# Patient Record
Sex: MALE | Race: WHITE | NOT HISPANIC OR LATINO | Employment: FULL TIME | URBAN - METROPOLITAN AREA
[De-identification: names, ages, dates, MRNs, and addresses within clinical notes are randomized per-mention and may not be internally consistent; named-entity substitution may affect disease eponyms.]

---

## 2017-01-04 DIAGNOSIS — K92.1 BLOOD IN STOOL: Primary | ICD-10-CM

## 2017-01-04 DIAGNOSIS — R19.5 OCCULT BLOOD POSITIVE STOOL: Primary | ICD-10-CM

## 2017-01-04 LAB
DEVELOPER EXPIRATION DATE: ABNORMAL
DEVELOPER LOT NUMBER: ABNORMAL
EXPIRATION DATE: ABNORMAL
FECAL OCCULT BLOOD SCREEN, POC: ABNORMAL
Lab: ABNORMAL
NEGATIVE CONTROL: NEGATIVE
POSITIVE CONTROL: POSITIVE

## 2017-01-04 PROCEDURE — 82274 ASSAY TEST FOR BLOOD FECAL: CPT | Performed by: NURSE PRACTITIONER

## 2017-01-04 NOTE — PROGRESS NOTES
Case discussed with nurse practitioner.  Agree with referral to gastroenterology for colonoscopy.  Dr. Wilks

## 2017-01-25 ENCOUNTER — TELEPHONE (OUTPATIENT)
Dept: GASTROENTEROLOGY | Facility: CLINIC | Age: 37
End: 2017-01-25

## 2017-01-26 ENCOUNTER — OFFICE VISIT (OUTPATIENT)
Dept: GASTROENTEROLOGY | Facility: CLINIC | Age: 37
End: 2017-01-26

## 2017-01-26 VITALS
BODY MASS INDEX: 27.23 KG/M2 | HEART RATE: 70 BPM | HEIGHT: 75 IN | TEMPERATURE: 98 F | DIASTOLIC BLOOD PRESSURE: 68 MMHG | SYSTOLIC BLOOD PRESSURE: 110 MMHG | WEIGHT: 219 LBS

## 2017-01-26 DIAGNOSIS — R19.7 DIARRHEA, UNSPECIFIED TYPE: Primary | ICD-10-CM

## 2017-01-26 DIAGNOSIS — Z80.0 FAMILY HISTORY OF GI MALIGNANCY: ICD-10-CM

## 2017-01-26 DIAGNOSIS — K62.5 RECTAL/ANAL HEMORRHAGE: ICD-10-CM

## 2017-01-26 PROCEDURE — 99204 OFFICE O/P NEW MOD 45 MIN: CPT | Performed by: INTERNAL MEDICINE

## 2017-01-26 RX ORDER — SODIUM CHLORIDE, SODIUM LACTATE, POTASSIUM CHLORIDE, CALCIUM CHLORIDE 600; 310; 30; 20 MG/100ML; MG/100ML; MG/100ML; MG/100ML
30 INJECTION, SOLUTION INTRAVENOUS CONTINUOUS
Status: CANCELLED | OUTPATIENT
Start: 2017-01-26

## 2017-01-26 RX ORDER — SODIUM CHLORIDE 0.9 % (FLUSH) 0.9 %
1-10 SYRINGE (ML) INJECTION AS NEEDED
Status: CANCELLED | OUTPATIENT
Start: 2017-01-26

## 2017-01-26 NOTE — PROGRESS NOTES
Subjective   Hiram Ho is a 36 y.o. male is being seen for consultation today at the request of RIKI Dow*  Chief Complaint   Patient presents with   • Rectal Bleeding   • Diarrhea     History of Present Illness  Usual state of health until December when he developed acute influenza a.  He was treated with Tamiflu and he believes that this may have triggered diarrhea which dragged on for a couple of weeks.  On a few occasions he did see red blood per rectum and he underwent Hemoccult testing through Dr. Wilks's office and that Hemoccult test was positive.  Right now he feels well but this alarmed his family as his family history is positive for colon cancer and they urged him to come for further evaluation.  The following portions of the patient's history were reviewed and updated as appropriate: allergies, current medications, past family history, past medical history, past social history, past surgical history and problem list.    Review of Systems   Constitutional: Negative for appetite change, diaphoresis, fatigue, fever and unexpected weight change.   HENT: Negative for hearing loss, mouth sores, sore throat and trouble swallowing.    Eyes: Negative for pain and redness.   Respiratory: Negative for choking and shortness of breath.    Cardiovascular: Negative for chest pain and leg swelling.   Gastrointestinal: Positive for anal bleeding and diarrhea. Negative for abdominal distention, abdominal pain, blood in stool, constipation, nausea, rectal pain and vomiting.   Genitourinary: Negative for flank pain and hematuria.   Musculoskeletal: Negative for arthralgias and joint swelling.   Skin: Negative for color change and rash.   Allergic/Immunologic: Negative for food allergies and immunocompromised state.   Neurological: Negative for dizziness, seizures and headaches.   Hematological: Does not bruise/bleed easily.   Psychiatric/Behavioral: Negative for confusion, sleep disturbance and suicidal  ideas. The patient is not nervous/anxious.        Objective   Physical Exam   Constitutional: He is oriented to person, place, and time. He appears well-developed and well-nourished.   HENT:   Head: Normocephalic and atraumatic.   Nose: Nose normal.   Eyes: Conjunctivae are normal. Pupils are equal, round, and reactive to light.   Neck: Normal range of motion. Neck supple. No thyromegaly present.   Cardiovascular: Normal heart sounds.  Exam reveals no gallop and no friction rub.    No murmur heard.  Pulmonary/Chest: Effort normal and breath sounds normal.   Abdominal: Soft. Bowel sounds are normal. He exhibits no distension and no mass. There is no tenderness.   Musculoskeletal: He exhibits no edema.   Lymphadenopathy:     He has no cervical adenopathy.   Neurological: He is alert and oriented to person, place, and time.   Skin: No rash noted. No erythema.   Psychiatric: He has a normal mood and affect. His behavior is normal.   Nursing note and vitals reviewed.        Assessment/Plan   Problems Addressed this Visit        Digestive    Diarrhea - Primary    Relevant Orders    Case Request (Completed)    Rectal/anal hemorrhage    Relevant Orders    Case Request (Completed)       Other    Family history of GI malignancy    Relevant Orders    Case Request (Completed)        Colonoscopy scheduled.

## 2017-01-26 NOTE — MR AVS SNAPSHOT
[facility] GASTROENTEROLOGY  [phone]                    Hiram Ho   1/26/2017 12:45 PM   Office Visit    Dept Phone:  [phone]   Encounter #:  [id number]    Provider:  Stephane Soto MD   Department:  [facility] GASTROENTEROLOGY                Your Full Care Plan              Today's Medication Changes          These changes are accurate as of: 1/26/17  1:24 PM.  If you have any questions, ask your nurse or doctor.               Stop taking medication(s)listed here:     oseltamivir 75 MG capsule   Commonly known as:  TAMIFLU   Stopped by:  Stephane Soto MD                      Your Updated Medication List          This list is accurate as of: 1/26/17  1:24 PM.  Always use your most recent med list.                cetirizine 10 MG tablet   Commonly known as:  zyrTEC       PROAIR RESPICLICK 108 (90 BASE) MCG/ACT inhaler   Generic drug:  albuterol   1-2 inhalations every 6-8 hours as needed               We Performed the Following     Case Request       You Were Diagnosed With        Codes Comments    Diarrhea, unspecified type    -  Primary ICD-10-CM: R19.7  ICD-9-CM: 787.91     Rectal/anal hemorrhage     ICD-10-CM: K62.5  ICD-9-CM: 569.3     Family history of GI malignancy     ICD-10-CM: Z80.0  ICD-9-CM: V16.0       Instructions     None    Patient Instructions History      Upcoming Appointments     Visit Type Date Time Department    NEW PATIENT 1/26/2017 12:45 PM [facility]    PHYSICAL 7/27/2017 10:00 AM [id number]      [employer] Signup     Our records indicate that you have an active [employer] account.    You can view your After Visit Summary by going to [employer] and logging in with your [employer] username and password.  If you don't have a [employer] username and password but a parent or guardian has access to your record, the parent or guardian should login with their own [employer] username and password and access  "your record to view the After Visit Summary.    If you have questions, you can email [email or web address] or call [phone] to talk to our MyChart staff.  Remember, [employer] is NOT to be used for urgent needs.  For medical emergencies, dial 911.               Other Info from Your Visit           Your Appointments     Jul 27, 2017 10:00 AM EDT   Physical with Freddie Wilks MD   [facility] PRIMARY CARE (--)    [address] [place] [id number] [phone]           Arrive 15 minutes prior to appointment.              Allergies     No Known Allergies      Reason for Visit     Rectal Bleeding     Diarrhea           Vital Signs     Blood Pressure Pulse Temperature Height Weight Body Mass Index    110/68 70 98 °F (36.7 °C) (Oral) 75\" (190.5 cm) 219 lb (99.3 kg) 27.37 kg/m2    Smoking Status                   Never Smoker           Problems and Diagnoses Noted     Diarrhea    Family history of GI malignancy    Anal bleeding        "

## 2017-02-13 ENCOUNTER — HOSPITAL ENCOUNTER (OUTPATIENT)
Facility: HOSPITAL | Age: 37
Setting detail: HOSPITAL OUTPATIENT SURGERY
Discharge: HOME | End: 2017-02-13
Attending: INTERNAL MEDICINE | Admitting: INTERNAL MEDICINE

## 2017-02-13 ENCOUNTER — ANESTHESIA (OUTPATIENT)
Dept: GASTROENTEROLOGY | Facility: HOSPITAL | Age: 37
End: 2017-02-13

## 2017-02-13 ENCOUNTER — ANESTHESIA EVENT (OUTPATIENT)
Dept: GASTROENTEROLOGY | Facility: HOSPITAL | Age: 37
End: 2017-02-13

## 2017-02-13 VITALS
BODY MASS INDEX: 25.82 KG/M2 | RESPIRATION RATE: 16 BRPM | HEIGHT: 75 IN | DIASTOLIC BLOOD PRESSURE: 76 MMHG | WEIGHT: 207.7 LBS | HEART RATE: 58 BPM | SYSTOLIC BLOOD PRESSURE: 114 MMHG | TEMPERATURE: 97.8 F | OXYGEN SATURATION: 97 %

## 2017-02-13 DIAGNOSIS — K62.5 RECTAL/ANAL HEMORRHAGE: ICD-10-CM

## 2017-02-13 DIAGNOSIS — R19.7 DIARRHEA, UNSPECIFIED TYPE: ICD-10-CM

## 2017-02-13 DIAGNOSIS — Z80.0 FAMILY HISTORY OF GI MALIGNANCY: ICD-10-CM

## 2017-02-13 PROCEDURE — 45378 DIAGNOSTIC COLONOSCOPY: CPT | Performed by: INTERNAL MEDICINE

## 2017-02-13 PROCEDURE — 25010000002 PROPOFOL 10 MG/ML EMULSION: Performed by: ANESTHESIOLOGY

## 2017-02-13 RX ORDER — PROPOFOL 10 MG/ML
VIAL (ML) INTRAVENOUS AS NEEDED
Status: DISCONTINUED | OUTPATIENT
Start: 2017-02-13 | End: 2017-02-13 | Stop reason: SURG

## 2017-02-13 RX ORDER — SODIUM CHLORIDE, SODIUM LACTATE, POTASSIUM CHLORIDE, CALCIUM CHLORIDE 600; 310; 30; 20 MG/100ML; MG/100ML; MG/100ML; MG/100ML
30 INJECTION, SOLUTION INTRAVENOUS CONTINUOUS
Status: DISCONTINUED | OUTPATIENT
Start: 2017-02-13 | End: 2017-02-13 | Stop reason: HOSPADM

## 2017-02-13 RX ORDER — SODIUM CHLORIDE 0.9 % (FLUSH) 0.9 %
1-10 SYRINGE (ML) INJECTION AS NEEDED
Status: DISCONTINUED | OUTPATIENT
Start: 2017-02-13 | End: 2017-02-13 | Stop reason: HOSPADM

## 2017-02-13 RX ORDER — LIDOCAINE HYDROCHLORIDE 20 MG/ML
INJECTION, SOLUTION INFILTRATION; PERINEURAL AS NEEDED
Status: DISCONTINUED | OUTPATIENT
Start: 2017-02-13 | End: 2017-02-13 | Stop reason: SURG

## 2017-02-13 RX ADMIN — LIDOCAINE HYDROCHLORIDE 100 MG: 20 INJECTION, SOLUTION INFILTRATION; PERINEURAL at 09:07

## 2017-02-13 RX ADMIN — SODIUM CHLORIDE, POTASSIUM CHLORIDE, SODIUM LACTATE AND CALCIUM CHLORIDE 30 ML/HR: 600; 310; 30; 20 INJECTION, SOLUTION INTRAVENOUS at 08:13

## 2017-02-13 RX ADMIN — PROPOFOL 200 MG: 10 INJECTION, EMULSION INTRAVENOUS at 09:20

## 2017-02-13 RX ADMIN — PROPOFOL 200 MG: 10 INJECTION, EMULSION INTRAVENOUS at 09:07

## 2017-02-13 NOTE — PLAN OF CARE
Problem: Patient Care Overview (Adult)  Goal: Plan of Care Review  Outcome: Ongoing (interventions implemented as appropriate)    02/13/17 0815   Coping/Psychosocial Response Interventions   Plan Of Care Reviewed With patient   Patient Care Overview   Progress no change   Outcome Evaluation   Outcome Summary/Follow up Plan pending procedure results       Goal: Adult Individualization and Mutuality  Outcome: Ongoing (interventions implemented as appropriate)    02/13/17 0815   Individualization   Patient Specific Preferences goes by Hiram   Mutuality/Individual Preferences   What Anxieties, Fears or Concerns Do You Have About Your Health or Care? anxious about results       Goal: Discharge Needs Assessment  Outcome: Ongoing (interventions implemented as appropriate)    02/13/17 0815   Discharge Needs Assessment   Concerns To Be Addressed no discharge needs identified   Discharge Disposition home or self-care   Living Environment   Transportation Available car         Problem: GI Endoscopy (Adult)  Goal: Signs and Symptoms of Listed Potential Problems Will be Absent or Manageable (GI Endoscopy)  Outcome: Ongoing (interventions implemented as appropriate)    02/13/17 0815   GI Endoscopy   Problems Assessed (GI Endoscopy) pain;bleeding;hypoxia/hypoxemia   Problems Present (GI Endoscopy) none

## 2017-02-13 NOTE — ANESTHESIA PREPROCEDURE EVALUATION
Anesthesia Evaluation     Patient summary reviewed and Nursing notes reviewed   NPO Status: > 8 hours   Airway   Mallampati: I  TM distance: >3 FB  Neck ROM: full  no difficulty expected  Dental - normal exam     Pulmonary - normal exam   (+) asthma,   Cardiovascular - negative cardio ROS and normal exam        Neuro/Psych- negative ROS  GI/Hepatic/Renal/Endo - negative ROS     Musculoskeletal (-) negative ROS    Abdominal  - normal exam    Bowel sounds: normal.   Substance History - negative use     OB/GYN negative ob/gyn ROS         Other                                    Anesthesia Plan    ASA 2     MAC     intravenous induction   Anesthetic plan and risks discussed with patient.

## 2017-02-13 NOTE — ANESTHESIA POSTPROCEDURE EVALUATION
Patient: Hiram Ho    Procedure Summary     Date Anesthesia Start Anesthesia Stop Room / Location    02/13/17 0906 0929  [facility] /  [facility]       Procedure Diagnosis Surgeon Provider    COLONOSCOPY TO CECUM (N/A ) Rectal/anal hemorrhage; Family history of GI malignancy; Diarrhea, unspecified type  (Rectal/anal hemorrhage [K62.5]; Family history of GI malignancy [Z80.0]; Diarrhea, unspecified type [R19.7]) MD Shakira Lemus MD          Anesthesia Type: MAC  Last vitals  /76 (02/13/17 0950)    Temp      Pulse 58 (02/13/17 0950)   Resp 16 (02/13/17 0950)    SpO2 97 % (02/13/17 0950)      Post Anesthesia Care and Evaluation    Patient location during evaluation: PHASE II  Patient participation: complete - patient participated  Level of consciousness: awake  Pain score: 0  Pain management: adequate  Airway patency: patent  Anesthetic complications: No anesthetic complications    Cardiovascular status: acceptable  Respiratory status: acceptable  Hydration status: acceptable

## 2017-07-27 ENCOUNTER — OFFICE VISIT (OUTPATIENT)
Dept: INTERNAL MEDICINE | Age: 37
End: 2017-07-27

## 2017-07-27 VITALS
WEIGHT: 224.6 LBS | TEMPERATURE: 98.2 F | OXYGEN SATURATION: 99 % | BODY MASS INDEX: 27.93 KG/M2 | DIASTOLIC BLOOD PRESSURE: 70 MMHG | HEIGHT: 75 IN | HEART RATE: 73 BPM | SYSTOLIC BLOOD PRESSURE: 108 MMHG

## 2017-07-27 DIAGNOSIS — J20.9 ACUTE BRONCHITIS, UNSPECIFIED ORGANISM: ICD-10-CM

## 2017-07-27 DIAGNOSIS — Z00.00 ROUTINE HEALTH MAINTENANCE: Primary | ICD-10-CM

## 2017-07-27 PROBLEM — K62.5 RECTAL/ANAL HEMORRHAGE: Status: RESOLVED | Noted: 2017-01-26 | Resolved: 2017-07-27

## 2017-07-27 PROBLEM — R19.7 DIARRHEA: Status: RESOLVED | Noted: 2017-01-26 | Resolved: 2017-07-27

## 2017-07-27 PROCEDURE — 99395 PREV VISIT EST AGE 18-39: CPT | Performed by: INTERNAL MEDICINE

## 2017-07-27 RX ORDER — ALBUTEROL SULFATE 90 UG/1
POWDER, METERED RESPIRATORY (INHALATION)
Qty: 1 INHALER | Refills: 0 | Status: SHIPPED | OUTPATIENT
Start: 2017-07-27 | End: 2018-08-09 | Stop reason: SDUPTHER

## 2017-07-27 RX ORDER — ALBUTEROL SULFATE 90 UG/1
POWDER, METERED RESPIRATORY (INHALATION)
Qty: 1 INHALER | Refills: 0 | COMMUNITY
Start: 2017-07-27 | End: 2017-07-27 | Stop reason: SDUPTHER

## 2017-07-27 NOTE — PROGRESS NOTES
"Hiram oH / 37 y.o. / male  07/27/2017  VITALS    /70  Pulse 73  Temp 98.2 °F (36.8 °C)  Ht 75\" (190.5 cm)  Wt 224 lb 9.6 oz (102 kg)  SpO2 99%  BMI 28.07 kg/m2  BP Readings from Last 3 Encounters:   07/27/17 108/70   04/30/17 110/73   02/13/17 114/76     Wt Readings from Last 3 Encounters:   07/27/17 224 lb 9.6 oz (102 kg)   02/13/17 207 lb 11.2 oz (94.2 kg)   01/26/17 219 lb (99.3 kg)      Body mass index is 28.07 kg/(m^2).    CC:  Main reason(s) for today's visit: Annual Exam (CPE)      HPI:     Patient presents today for yearly physical.    Health maintenance: Last eye doctor visit the past month.  Dentist for next week.  Exercise at this time limited.  Patient is concerned because of increasing weight (increased 17 pounds over the past year).  We did discuss the need for regular aerobic exercise at least 30 minutes 5 times per week, reduction in calorie intake (I would eliminate soft drinks).  Goal weight loss 1-2 pounds per week.  He can achieve his goal weight of 200 pounds and proximally 10 weeks.    Patient Care Team:  Freddie Wilks MD as PCP - General    ____________________________________________________________________    ASSESSMENT & PLAN:    Problem List Items Addressed This Visit        Unprioritized    Routine health maintenance - Primary    Relevant Orders    CBC & Differential    Comprehensive Metabolic Panel    Lipid Panel    Urinalysis With / Microscopic If Indicated      Other Visit Diagnoses     Acute bronchitis, unspecified organism        Relevant Medications    PROAIR RESPICLICK 108 (90 BASE) MCG/ACT inhaler        Orders Placed This Encounter   Procedures   • Comprehensive Metabolic Panel   • Lipid Panel   • Urinalysis With / Microscopic If Indicated       Summary/Discussion:     · #1 routine health maintenance, clinically stable.  My main concern is patient's weight gain.  We did discuss several forms of exercise and caloric intake restriction to rectify this issue.  Goal " weight 200 pounds.  Laboratory as above, will follow-up results online.  Recommend repeat exam one year.      Return in about 1 year (around 7/27/2018) for Annual physical.    No future appointments.    ______________________________________________________    REVIEW OF SYSTEMS    Review of Systems   Constitutional: Negative.    HENT: Negative.    Eyes: Negative.    Respiratory: Negative.    Cardiovascular: Negative.    Gastrointestinal: Negative.    Endocrine: Negative.    Genitourinary: Negative.    Musculoskeletal: Negative.    Skin: Negative.    Allergic/Immunologic: Negative for immunocompromised state.   Neurological: Negative.    Hematological: Negative.    Psychiatric/Behavioral: Positive for sleep disturbance.        Patient has increased stress which is interfering with sleep.  There have been injuries to each of his children over this past year which has worsened the situation           PHYSICAL EXAMINATION    Physical Exam   Constitutional: He is oriented to person, place, and time. He appears well-developed and well-nourished. No distress.   HENT:   Head: Normocephalic and atraumatic.   Right Ear: Tympanic membrane, external ear and ear canal normal.   Left Ear: Tympanic membrane, external ear and ear canal normal.   Mouth/Throat: Uvula is midline, oropharynx is clear and moist and mucous membranes are normal.   Eyes: Conjunctivae and EOM are normal. Pupils are equal, round, and reactive to light.   Neck: Normal range of motion. Neck supple. No JVD present. Carotid bruit is not present. No thyromegaly present.   Cardiovascular: Normal rate, regular rhythm, normal heart sounds and intact distal pulses.  Exam reveals no gallop and no friction rub.    No murmur heard.  Pulmonary/Chest: Effort normal and breath sounds normal. He has no wheezes. He has no rales.   Abdominal: Soft. Bowel sounds are normal. There is no hepatosplenomegaly. There is no tenderness. Hernia confirmed negative in the right inguinal  area and confirmed negative in the left inguinal area.   Musculoskeletal: Normal range of motion. He exhibits no edema or deformity.   Lymphadenopathy:     He has no cervical adenopathy.   Neurological: He is alert and oriented to person, place, and time. He has normal strength and normal reflexes. No cranial nerve deficit or sensory deficit. Coordination normal.   Skin: Skin is warm and dry. No rash noted.   Psychiatric: He has a normal mood and affect. His speech is normal and behavior is normal. Judgment and thought content normal. Cognition and memory are normal.   Nursing note and vitals reviewed.      REVIEWED DATA:    Labs:   Lab Results   Component Value Date     07/25/2016    K 4.2 07/25/2016    AST 24 07/25/2016    ALT 20 07/25/2016    BUN 14 07/25/2016    CREATININE 0.93 07/25/2016    CREATININE 1.04 07/27/2015    EGFRIFNONA 91 07/25/2016    EGFRIFAFRI 111 07/25/2016          Lab Results   Component Value Date    GLU 92 07/25/2016    GLU 90 07/27/2015       Lab Results   Component Value Date     (H) 07/25/2016     (H) 07/27/2015    HDL 41 07/25/2016    TRIG 129 07/25/2016       No results found for: TSH, FREET4       Lab Results   Component Value Date    WBC 5.15 07/25/2016    HGB 15.3 07/25/2016    HGB 15.9 07/27/2015     07/25/2016        Imaging:        Medical Tests:        Summary of old records / correspondence / consultant report:        Request outside records:        No Known Allergies    Current Outpatient Prescriptions   Medication Sig Dispense Refill   • cetirizine (ZyrTEC) 10 MG tablet Take 10 mg by mouth daily.     • PROAIR RESPICLICK 108 (90 BASE) MCG/ACT inhaler 1-2 inhalations every 6-8 hours as needed 1 inhaler 0     No current facility-administered medications for this visit.        PFSH:     The following portions of the patient's history were reviewed and updated as appropriate: Allergies / Current Medications / Past Medical History / Surgical History /  Social History / Family History    Patient Active Problem List   Diagnosis   • Routine health maintenance   • Family history of GI malignancy       Past Medical History:   Diagnosis Date   • Asian flu type A    • Asthma    • Earache    • Environmental and seasonal allergies        Past Surgical History:   Procedure Laterality Date   • COLONOSCOPY N/A 2/13/2017    Procedure: COLONOSCOPY TO CECUM;  Surgeon: Stephane Soto MD;  Location: [facility] ENDOSCOPY;  Service:    • DENTAL PROCEDURE     • MANDIBLE FRACTURE SURGERY         Social History     Social History   • Marital status:      Spouse name: N/A   • Number of children: 4   • Years of education: N/A     Occupational History   • Teacher      Social History Main Topics   • Smoking status: Never Smoker   • Smokeless tobacco: Never Used   • Alcohol use Yes      Comment: RARELY   • Drug use: No   • Sexual activity: Defer     Other Topics Concern   • None     Social History Narrative       Family History   Problem Relation Age of Onset   • Diabetes Mother    • Colon cancer Paternal Grandmother    • Colon cancer Maternal Grandmother          **Lili Disclaimer:   Much of this encounter note is an electronic transcription/translation of spoken language to printed text. The electronic translation of spoken language may permit erroneous, or at times, nonsensical words or phrases to be inadvertently transcribed. Although I have reviewed the note for such errors, some may still exist.

## 2017-08-09 DIAGNOSIS — J20.9 ACUTE BRONCHITIS, UNSPECIFIED ORGANISM: ICD-10-CM

## 2017-08-09 LAB
ALBUMIN SERPL-MCNC: 4.4 G/DL (ref 3.5–5.2)
ALBUMIN/GLOB SERPL: 1.6 G/DL
ALP SERPL-CCNC: 44 U/L (ref 39–117)
ALT SERPL-CCNC: 20 U/L (ref 1–41)
APPEARANCE UR: CLEAR
AST SERPL-CCNC: 17 U/L (ref 1–40)
BASOPHILS # BLD AUTO: 0.02 10*3/MM3 (ref 0–0.2)
BASOPHILS NFR BLD AUTO: 0.4 % (ref 0–1.5)
BILIRUB SERPL-MCNC: 0.4 MG/DL (ref 0.1–1.2)
BILIRUB UR QL STRIP: NEGATIVE
BUN SERPL-MCNC: 18 MG/DL (ref 6–20)
BUN/CREAT SERPL: 16.4 (ref 7–25)
CALCIUM SERPL-MCNC: 9.6 MG/DL (ref 8.6–10.5)
CHLORIDE SERPL-SCNC: 102 MMOL/L (ref 98–107)
CHOLEST SERPL-MCNC: 214 MG/DL (ref 0–200)
CO2 SERPL-SCNC: 25.6 MMOL/L (ref 22–29)
COLOR UR: YELLOW
CREAT SERPL-MCNC: 1.1 MG/DL (ref 0.76–1.27)
EOSINOPHIL # BLD AUTO: 0.14 10*3/MM3 (ref 0–0.7)
EOSINOPHIL NFR BLD AUTO: 2.7 % (ref 0.3–6.2)
ERYTHROCYTE [DISTWIDTH] IN BLOOD BY AUTOMATED COUNT: 12.7 % (ref 11.5–14.5)
GLOBULIN SER CALC-MCNC: 2.8 GM/DL
GLUCOSE SERPL-MCNC: 96 MG/DL (ref 65–99)
GLUCOSE UR QL: NEGATIVE
HCT VFR BLD AUTO: 47.1 % (ref 40.4–52.2)
HDLC SERPL-MCNC: 46 MG/DL (ref 40–60)
HGB BLD-MCNC: 15.9 G/DL (ref 13.7–17.6)
HGB UR QL STRIP: NEGATIVE
IMM GRANULOCYTES # BLD: 0 10*3/MM3 (ref 0–0.03)
IMM GRANULOCYTES NFR BLD: 0 % (ref 0–0.5)
KETONES UR QL STRIP: NEGATIVE
LDLC SERPL CALC-MCNC: 151 MG/DL (ref 0–100)
LEUKOCYTE ESTERASE UR QL STRIP: NEGATIVE
LYMPHOCYTES # BLD AUTO: 1.81 10*3/MM3 (ref 0.9–4.8)
LYMPHOCYTES NFR BLD AUTO: 34.8 % (ref 19.6–45.3)
MCH RBC QN AUTO: 32.3 PG (ref 27–32.7)
MCHC RBC AUTO-ENTMCNC: 33.8 G/DL (ref 32.6–36.4)
MCV RBC AUTO: 95.7 FL (ref 79.8–96.2)
MONOCYTES # BLD AUTO: 0.55 10*3/MM3 (ref 0.2–1.2)
MONOCYTES NFR BLD AUTO: 10.6 % (ref 5–12)
NEUTROPHILS # BLD AUTO: 2.68 10*3/MM3 (ref 1.9–8.1)
NEUTROPHILS NFR BLD AUTO: 51.5 % (ref 42.7–76)
NITRITE UR QL STRIP: NEGATIVE
PH UR STRIP: 6 [PH] (ref 5–8)
PLATELET # BLD AUTO: 213 10*3/MM3 (ref 140–500)
POTASSIUM SERPL-SCNC: 4.5 MMOL/L (ref 3.5–5.2)
PROT SERPL-MCNC: 7.2 G/DL (ref 6–8.5)
PROT UR QL STRIP: NEGATIVE
RBC # BLD AUTO: 4.92 10*6/MM3 (ref 4.6–6)
SODIUM SERPL-SCNC: 140 MMOL/L (ref 136–145)
SP GR UR: 1.01 (ref 1–1.03)
TRIGL SERPL-MCNC: 87 MG/DL (ref 0–150)
UROBILINOGEN UR STRIP-MCNC: NORMAL MG/DL
VLDLC SERPL CALC-MCNC: 17.4 MG/DL (ref 5–40)
WBC # BLD AUTO: 5.2 10*3/MM3 (ref 4.5–10.7)

## 2017-08-09 RX ORDER — ALBUTEROL SULFATE 90 UG/1
POWDER, METERED RESPIRATORY (INHALATION)
Qty: 1 G | Refills: 0 | Status: SHIPPED | OUTPATIENT
Start: 2017-08-09 | End: 2018-02-12 | Stop reason: SDUPTHER

## 2017-09-21 ENCOUNTER — TELEPHONE (OUTPATIENT)
Dept: INTERNAL MEDICINE | Age: 37
End: 2017-09-21

## 2017-09-21 NOTE — TELEPHONE ENCOUNTER
Pt says he received a letter from Dr. Wilks about his labs and was told to call him for more information please call Pt about this information his # [phone]

## 2017-09-21 NOTE — TELEPHONE ENCOUNTER
Contact patient, I have used a different calculator to assess his  risk of cardiovascular event. According to my calculations, no treatment is indicated this time since his risk is low over the next 10 years at  1%. At this time, no treatment is indicated. I am sorry for the confusion.

## 2018-02-12 ENCOUNTER — OFFICE VISIT (OUTPATIENT)
Dept: INTERNAL MEDICINE | Age: 38
End: 2018-02-12

## 2018-02-12 VITALS
BODY MASS INDEX: 27.33 KG/M2 | SYSTOLIC BLOOD PRESSURE: 122 MMHG | HEART RATE: 81 BPM | TEMPERATURE: 99.8 F | DIASTOLIC BLOOD PRESSURE: 70 MMHG | WEIGHT: 219.8 LBS | OXYGEN SATURATION: 98 % | HEIGHT: 75 IN

## 2018-02-12 DIAGNOSIS — J11.1 INFLUENZA: Primary | ICD-10-CM

## 2018-02-12 PROCEDURE — 99213 OFFICE O/P EST LOW 20 MIN: CPT | Performed by: NURSE PRACTITIONER

## 2018-02-12 NOTE — PATIENT INSTRUCTIONS

## 2018-02-12 NOTE — PROGRESS NOTES
Subjective   Hiram Ho is a 37 y.o. male.     URI    This is a new problem. The current episode started in the past 7 days (5 days ago). The problem has been rapidly worsening. The maximum temperature recorded prior to his arrival was 100.4 - 100.9 F. Associated symptoms include congestion, coughing (productive) and headaches. Pertinent negatives include no chest pain, diarrhea, nausea, rhinorrhea, vomiting or wheezing. Associated symptoms comments: Chills/ fever, myalgias x 1 day. Treatments tried: Nyquil.   He did not have flu vaccination this season. Went to Urgent care 3 days ago, tested negative for strep, did not get tested for flu.   Has been using albuterol over the weekend as needed. Felt as if he had a URI the past few days, suddenly started with chills, fever, and body aches last PM.   Sick contacts at work (works as ) and children at home (+ strep, URI).     The following portions of the patient's history were reviewed and updated as appropriate: allergies, current medications, past family history, past medical history, past social history, past surgical history and problem list.    Review of Systems   Constitutional: Positive for chills, fatigue and fever.   HENT: Positive for congestion. Negative for rhinorrhea.    Respiratory: Positive for cough (productive). Negative for wheezing.    Cardiovascular: Negative for chest pain.   Gastrointestinal: Negative for diarrhea, nausea and vomiting.   Neurological: Positive for headaches.       Objective   Physical Exam   Constitutional: He is oriented to person, place, and time. Vital signs are normal. He appears well-developed and well-nourished. He is cooperative. He does not appear ill. No distress.   HENT:   Head: Normocephalic and atraumatic.   Right Ear: Hearing, tympanic membrane, external ear and ear canal normal. No drainage or swelling. Tympanic membrane is not injected and not erythematous. No middle ear effusion.   Left Ear:  Hearing, tympanic membrane, external ear and ear canal normal. No drainage or swelling. Tympanic membrane is not injected and not erythematous.  No middle ear effusion.   Nose: Nose normal.   Mouth/Throat: Uvula is midline, oropharynx is clear and moist and mucous membranes are normal. No tonsillar exudate.   Cardiovascular: Normal rate, regular rhythm and normal heart sounds.    No murmur heard.  Pulmonary/Chest: Effort normal. He has no decreased breath sounds. He has wheezes in the right upper field and the left upper field. He has no rhonchi. He has no rales.   Lymphadenopathy:     He has no cervical adenopathy.        Right cervical: No superficial cervical, no deep cervical and no posterior cervical adenopathy present.       Left cervical: No superficial cervical, no deep cervical and no posterior cervical adenopathy present.   Neurological: He is alert and oriented to person, place, and time.   Skin: Skin is warm, dry and intact.   Psychiatric: He has a normal mood and affect. His speech is normal and behavior is normal. Judgment and thought content normal. Cognition and memory are normal.   Nursing note and vitals reviewed.      Assessment/Plan   Problems Addressed this Visit     None      Visit Diagnoses     Influenza    -  Primary        1. Influenza  Suspect that POCT is likely false negative as patient's flu-like symptoms started < 24 hours ago. Highly suspicious for flu. Discussed conservative treatment of symptoms, including use of cough suppressants during the day, Tylenol or ibuprofen as needed for body aches, increase by mouth fluids.  Discussed that influenza is usually contagious for 48 hours prior to onset of symptoms up to 5 days after.  Discussed with patient risks versus benefit of taking Tamiflu, patient has taken before and had significant side effects of bloody diarrhea which required GI workup and colonoscopy.  Discussed with patient that I would recommend against Tamiflu, patient agrees.   Instructed to follow-up if no improvement or worsening of symptoms in the next 5-7 days.

## 2018-05-08 ENCOUNTER — OFFICE VISIT (OUTPATIENT)
Dept: INTERNAL MEDICINE | Age: 38
End: 2018-05-08

## 2018-05-08 VITALS
SYSTOLIC BLOOD PRESSURE: 112 MMHG | BODY MASS INDEX: 29.05 KG/M2 | WEIGHT: 226.4 LBS | OXYGEN SATURATION: 98 % | HEIGHT: 74 IN | HEART RATE: 69 BPM | DIASTOLIC BLOOD PRESSURE: 68 MMHG | TEMPERATURE: 98.2 F

## 2018-05-08 DIAGNOSIS — H69.81 EUSTACHIAN TUBE DYSFUNCTION, RIGHT: Primary | ICD-10-CM

## 2018-05-08 PROCEDURE — 99213 OFFICE O/P EST LOW 20 MIN: CPT | Performed by: INTERNAL MEDICINE

## 2018-05-08 NOTE — PROGRESS NOTES
"[facility] – [place] [facility]NAL MEDICINE        Hiram BERTRAM Ho / 38 y.o. / male  05/08/2018      ASSESSMENT & PLAN:    Problem List Items Addressed This Visit     None      Visit Diagnoses     Eustachian tube dysfunction, right    -  Primary        No orders of the defined types were placed in this encounter.      Summary/Discussion:    Number-one suspect eustachian dysfunction of the right side.  Patient is currently taking antihistamine and intranasal steroids.  Continue same, and over-the-counter decongestant Sudafed PE decongestant, he will contact me by the end of the week if his symptoms have not improved to his satisfaction.  He can discontinue the use of Debrox at this time.      Return if symptoms worsen or fail to improve.    ____________________________________________________________________    VITALS    Visit Vitals  /68   Pulse 69   Temp 98.2 °F (36.8 °C)   Ht 188 cm (74.02\")   Wt 103 kg (226 lb 6.4 oz)   SpO2 98%   BMI 29.06 kg/m²       BP Readings from Last 3 Encounters:   05/08/18 112/68   04/13/18 119/68   02/12/18 122/70     Wt Readings from Last 3 Encounters:   05/08/18 103 kg (226 lb 6.4 oz)   04/13/18 95.3 kg (210 lb)   02/12/18 99.7 kg (219 lb 12.8 oz)      Body mass index is 29.06 kg/m².    CC:  Main reason(s) for today's visit: Earache (1-2 wks)      HPI:     Patient was seen in [facility] center to 3 weeks ago with discomfort in the right ear.  He was told that this was secondary to earwax, and his ear was cleaned out at that time.  Unfortunately continues to have symptoms.  He notes discomfort, and continued impairment with adequate hearing.  Discomfort is 5/10 in severity and intermittent in occurrence.  His symptoms have continued despite use of Zyrtec and Flonase.    Patient Care Team:  Freddie Wilks MD as PCP - General  ____________________________________________________________________    REVIEW OF SYSTEMS    Review of Systems   Constitutional: Negative for appetite change, chills and " diaphoresis.   HENT: Negative for postnasal drip, rhinorrhea and sore throat.    Respiratory: Negative for cough.          PHYSICAL EXAMINATION    Physical Exam   Constitutional: He is oriented to person, place, and time. He appears well-developed and well-nourished.   HENT:   Right Ear: Hearing, external ear and ear canal normal. Tympanic membrane is retracted.   Left Ear: Hearing, tympanic membrane, external ear and ear canal normal.   Neck: Normal range of motion. Neck supple.   Lymphadenopathy:     He has no cervical adenopathy.   Neurological: He is alert and oriented to person, place, and time.   Skin: Skin is warm and dry.   Psychiatric: He has a normal mood and affect. His behavior is normal. Judgment and thought content normal.   Nursing note and vitals reviewed.      REVIEWED DATA:    Labs:     Lab Results   Component Value Date     08/09/2017    K 4.5 08/09/2017    AST 17 08/09/2017    ALT 20 08/09/2017    BUN 18 08/09/2017    CREATININE 1.10 08/09/2017    CREATININE 0.93 07/25/2016    CREATININE 1.04 07/27/2015    EGFRIFNONA 75 08/09/2017    EGFRIFAFRI 91 08/09/2017       No results found for: HGBA1C, GLUCOSE, MICROALBUR    Lab Results   Component Value Date     (H) 08/09/2017     (H) 07/25/2016     (H) 07/27/2015    HDL 46 08/09/2017    TRIG 87 08/09/2017       No results found for: TSH, FREET4    Lab Results   Component Value Date    WBC 5.20 08/09/2017    HGB 15.9 08/09/2017    HGB 15.3 07/25/2016    HGB 15.9 07/27/2015     08/09/2017       Imaging:         Medical Tests:         Summary of old records / correspondence / consultant report:         Request outside records:         ALLERGIES  No Known Allergies     MEDICATIONS  Current Outpatient Prescriptions   Medication Sig Dispense Refill   • cetirizine (ZyrTEC) 10 MG tablet Take 10 mg by mouth daily.     • PROAIR RESPICLICK 108 (90 BASE) MCG/ACT inhaler 1-2 inhalations every 6-8 hours as needed 1 inhaler 0     No  current facility-administered medications for this visit.        PFSH:     The following portions of the patient's history were reviewed and updated as appropriate: Allergies / Current Medications / Past Medical History / Surgical History / Social History / Family History    PROBLEM LIST   Patient Active Problem List   Diagnosis   • Routine health maintenance   • Family history of GI malignancy       PAST MEDICAL HISTORY  Past Medical History:   Diagnosis Date   • Asian flu type A    • Asthma    • Earache    • Environmental and seasonal allergies        SURGICAL HISTORY  Past Surgical History:   Procedure Laterality Date   • COLONOSCOPY N/A 2/13/2017    Procedure: COLONOSCOPY TO CECUM;  Surgeon: Stephane Soto MD;  Location: [facility] ENDOSCOPY;  Service:    • DENTAL PROCEDURE     • MANDIBLE FRACTURE SURGERY         SOCIAL HISTORY  Social History     Social History   • Marital status:    • Number of children: 4     Occupational History   • Teacher      Social History Main Topics   • Smoking status: Never Smoker   • Smokeless tobacco: Never Used   • Alcohol use Yes      Comment: RARELY   • Drug use: No   • Sexual activity: Defer     Other Topics Concern   • Not on file       FAMILY HISTORY  Family History   Problem Relation Age of Onset   • Diabetes Mother    • Colon cancer Paternal Grandmother    • Colon cancer Maternal Grandmother          **Lili Disclaimer:   Much of this encounter note is an electronic transcription/translation of spoken language to printed text. The electronic translation of spoken language may permit erroneous, or at times, nonsensical words or phrases to be inadvertently transcribed. Although I have reviewed the note for such errors, some may still exist.

## 2018-05-11 ENCOUNTER — TELEPHONE (OUTPATIENT)
Dept: INTERNAL MEDICINE | Age: 38
End: 2018-05-11

## 2018-05-11 DIAGNOSIS — H92.01 EARACHE, CHRONIC, RIGHT: Primary | ICD-10-CM

## 2018-05-11 DIAGNOSIS — G89.29 EARACHE, CHRONIC, RIGHT: Primary | ICD-10-CM

## 2018-05-11 NOTE — TELEPHONE ENCOUNTER
Pt called stating he was to call back today if his right ear wasn't any better. Pt stated Dr Wilks was to refer him to an ENT  Pt's # [phone]  Thanks SP

## 2018-07-27 ENCOUNTER — TELEPHONE (OUTPATIENT)
Dept: INTERNAL MEDICINE | Age: 38
End: 2018-07-27

## 2018-07-27 NOTE — TELEPHONE ENCOUNTER
"Pt called stating \"someone\" had called and told him his cpe was rescheduled to a different time of 9:20 am on Aug 1st, but pt stated he can't do that time. Pt said, he knows Dr Wilks is booked far out and asking if you can reschedule him soon?  Pt's # [phone]  Thanks SP  "

## 2018-08-09 ENCOUNTER — OFFICE VISIT (OUTPATIENT)
Dept: INTERNAL MEDICINE | Age: 38
End: 2018-08-09

## 2018-08-09 VITALS
HEART RATE: 67 BPM | HEIGHT: 74 IN | OXYGEN SATURATION: 96 % | TEMPERATURE: 97.6 F | WEIGHT: 228 LBS | BODY MASS INDEX: 29.26 KG/M2 | SYSTOLIC BLOOD PRESSURE: 118 MMHG | DIASTOLIC BLOOD PRESSURE: 74 MMHG

## 2018-08-09 DIAGNOSIS — J20.9 ACUTE BRONCHITIS, UNSPECIFIED ORGANISM: ICD-10-CM

## 2018-08-09 DIAGNOSIS — Z00.00 ROUTINE HEALTH MAINTENANCE: Primary | ICD-10-CM

## 2018-08-09 LAB
ALBUMIN SERPL-MCNC: 4.3 G/DL (ref 3.5–5.2)
ALBUMIN/GLOB SERPL: 2 G/DL
ALP SERPL-CCNC: 39 U/L (ref 39–117)
ALT SERPL-CCNC: 30 U/L (ref 1–41)
APPEARANCE UR: CLEAR
AST SERPL-CCNC: 18 U/L (ref 1–40)
BASOPHILS # BLD AUTO: 0.03 10*3/MM3 (ref 0–0.2)
BASOPHILS NFR BLD AUTO: 0.5 % (ref 0–1.5)
BILIRUB SERPL-MCNC: 0.5 MG/DL (ref 0.1–1.2)
BILIRUB UR QL STRIP: NEGATIVE
BUN SERPL-MCNC: 16 MG/DL (ref 6–20)
BUN/CREAT SERPL: 15.8 (ref 7–25)
CALCIUM SERPL-MCNC: 9.2 MG/DL (ref 8.6–10.5)
CHLORIDE SERPL-SCNC: 103 MMOL/L (ref 98–107)
CHOLEST SERPL-MCNC: 182 MG/DL (ref 0–200)
CO2 SERPL-SCNC: 25.9 MMOL/L (ref 22–29)
COLOR UR: YELLOW
CREAT SERPL-MCNC: 1.01 MG/DL (ref 0.76–1.27)
EOSINOPHIL # BLD AUTO: 0.14 10*3/MM3 (ref 0–0.7)
EOSINOPHIL NFR BLD AUTO: 2.5 % (ref 0.3–6.2)
ERYTHROCYTE [DISTWIDTH] IN BLOOD BY AUTOMATED COUNT: 12.3 % (ref 11.5–14.5)
GLOBULIN SER CALC-MCNC: 2.2 GM/DL
GLUCOSE SERPL-MCNC: 99 MG/DL (ref 65–99)
GLUCOSE UR QL: NEGATIVE
HCT VFR BLD AUTO: 47.7 % (ref 40.4–52.2)
HDLC SERPL-MCNC: 34 MG/DL (ref 40–60)
HGB BLD-MCNC: 15.7 G/DL (ref 13.7–17.6)
HGB UR QL STRIP: NEGATIVE
IMM GRANULOCYTES # BLD: 0 10*3/MM3 (ref 0–0.03)
IMM GRANULOCYTES NFR BLD: 0 % (ref 0–0.5)
KETONES UR QL STRIP: NEGATIVE
LDLC SERPL CALC-MCNC: 106 MG/DL (ref 0–100)
LEUKOCYTE ESTERASE UR QL STRIP: NEGATIVE
LYMPHOCYTES # BLD AUTO: 2.02 10*3/MM3 (ref 0.9–4.8)
LYMPHOCYTES NFR BLD AUTO: 36.1 % (ref 19.6–45.3)
MCH RBC QN AUTO: 31.6 PG (ref 27–32.7)
MCHC RBC AUTO-ENTMCNC: 32.9 G/DL (ref 32.6–36.4)
MCV RBC AUTO: 96 FL (ref 79.8–96.2)
MONOCYTES # BLD AUTO: 0.59 10*3/MM3 (ref 0.2–1.2)
MONOCYTES NFR BLD AUTO: 10.5 % (ref 5–12)
NEUTROPHILS # BLD AUTO: 2.82 10*3/MM3 (ref 1.9–8.1)
NEUTROPHILS NFR BLD AUTO: 50.4 % (ref 42.7–76)
NITRITE UR QL STRIP: NEGATIVE
PH UR STRIP: 5.5 [PH] (ref 5–8)
PLATELET # BLD AUTO: 208 10*3/MM3 (ref 140–500)
POTASSIUM SERPL-SCNC: 4.2 MMOL/L (ref 3.5–5.2)
PROT SERPL-MCNC: 6.5 G/DL (ref 6–8.5)
PROT UR QL STRIP: NEGATIVE
RBC # BLD AUTO: 4.97 10*6/MM3 (ref 4.6–6)
SODIUM SERPL-SCNC: 141 MMOL/L (ref 136–145)
SP GR UR: 1.02 (ref 1–1.03)
TRIGL SERPL-MCNC: 209 MG/DL (ref 0–150)
UROBILINOGEN UR STRIP-MCNC: NORMAL MG/DL
VLDLC SERPL CALC-MCNC: 41.8 MG/DL (ref 5–40)
WBC # BLD AUTO: 5.6 10*3/MM3 (ref 4.5–10.7)

## 2018-08-09 PROCEDURE — 99395 PREV VISIT EST AGE 18-39: CPT | Performed by: INTERNAL MEDICINE

## 2018-08-09 RX ORDER — ALBUTEROL SULFATE 90 UG/1
POWDER, METERED RESPIRATORY (INHALATION)
Qty: 1 INHALER | Refills: 0 | COMMUNITY
Start: 2018-08-09 | End: 2019-01-13 | Stop reason: SDUPTHER

## 2018-08-09 NOTE — PROGRESS NOTES
"[facility] [place] INTERNAL MEDICINE  MD Hiram FIERRO R Ho / 38 y.o. / male  08/09/2018      ASSESSMENT & PLAN:    Problem List Items Addressed This Visit        Unprioritized    Routine health maintenance - Primary    Relevant Orders    CBC & Differential    Comprehensive Metabolic Panel    Lipid Panel    Urinalysis With Microscopic If Indicated (No Culture) - Urine, Clean Catch      Other Visit Diagnoses     Acute bronchitis, unspecified organism        Relevant Medications    PROAIR RESPICLICK 108 (90 Base) MCG/ACT inhaler        Orders Placed This Encounter   Procedures   • Comprehensive Metabolic Panel   • Lipid Panel   • Urinalysis With Microscopic If Indicated (No Culture) - Urine, Clean Catch   • CBC & Differential       Summary/Discussion:    Number-one healthy young male exam see comments above and below.  My main concern today is patient's weight.  He is advised to exercise more regularly to achieve a more reasonable weight for him at 200-205 pounds.  Laboratory as above, follow results online adjust treatment if indicated.  Recommend repeat exam one year.      Return in about 1 year (around 8/9/2019) for Annual physical.    ____________________________________________________________________    VITALS    Visit Vitals  /74   Pulse 67   Temp 97.6 °F (36.4 °C)   Ht 188 cm (74.02\")   Wt 103 kg (228 lb)   SpO2 96%   BMI 29.26 kg/m²       BP Readings from Last 3 Encounters:   08/09/18 118/74   05/08/18 112/68   04/13/18 119/68     Wt Readings from Last 3 Encounters:   08/09/18 103 kg (228 lb)   05/08/18 103 kg (226 lb 6.4 oz)   04/13/18 95.3 kg (210 lb)      Body mass index is 29.26 kg/m².    CC:  Main reason(s) for today's visit: Annual Exam (cpe)      HPI:     Patient presents this morning for annual physical.    Health maintenance: Last ophthalmology visit summer 2018, dentist 6 months exercise patient does use treadmill typically during the school year.  He was less compliant with her during the " summer months.  Return to school within next week or so and return visit regular exercise regimen.    Patient Care Team:  Freddie Wilks MD as PCP - General  ____________________________________________________________________    REVIEW OF SYSTEMS    Review of Systems   Constitutional: Negative.    HENT: Negative.         Patient was seen by ENT in May because of non-resolving eustachian dysfunction.  He was treated with oral steroids and the symptoms have resolved.   Eyes: Negative.    Respiratory: Negative.    Cardiovascular: Negative.    Gastrointestinal: Negative.    Endocrine: Negative.    Genitourinary: Negative.    Musculoskeletal: Negative.    Skin: Negative.    Allergic/Immunologic: Negative for immunocompromised state.   Neurological: Negative.    Hematological: Negative.    Psychiatric/Behavioral: Negative.          PHYSICAL EXAMINATION    Physical Exam   Constitutional: He is oriented to person, place, and time. He appears well-developed and well-nourished. No distress.   HENT:   Head: Normocephalic and atraumatic.   Right Ear: Tympanic membrane, external ear and ear canal normal.   Left Ear: Tympanic membrane, external ear and ear canal normal.   Mouth/Throat: Uvula is midline, oropharynx is clear and moist and mucous membranes are normal.   Eyes: Pupils are equal, round, and reactive to light. Conjunctivae and EOM are normal.   Neck: Normal range of motion. Neck supple. No JVD present. Carotid bruit is not present. No thyromegaly present.   Cardiovascular: Normal rate, regular rhythm, normal heart sounds and intact distal pulses.  Exam reveals no gallop and no friction rub.    No murmur heard.  Pulmonary/Chest: Effort normal and breath sounds normal. He has no wheezes. He has no rales.   Abdominal: Soft. Bowel sounds are normal. There is no hepatosplenomegaly. There is no tenderness.   Musculoskeletal: Normal range of motion. He exhibits no edema or deformity.   Lymphadenopathy:     He has no cervical  adenopathy.   Neurological: He is alert and oriented to person, place, and time. He has normal strength and normal reflexes. No cranial nerve deficit or sensory deficit. Coordination normal.   Skin: Skin is warm and dry. No rash noted.   Psychiatric: He has a normal mood and affect. His speech is normal and behavior is normal. Judgment and thought content normal. Cognition and memory are normal.   Nursing note and vitals reviewed.        REVIEWED DATA:    Labs:     Lab Results   Component Value Date     08/09/2017    K 4.5 08/09/2017    AST 17 08/09/2017    ALT 20 08/09/2017    BUN 18 08/09/2017    CREATININE 1.10 08/09/2017    CREATININE 0.93 07/25/2016    CREATININE 1.04 07/27/2015    EGFRIFNONA 75 08/09/2017    EGFRIFAFRI 91 08/09/2017       No results found for: HGBA1C, GLUCOSE, MICROALBUR    Lab Results   Component Value Date     (H) 08/09/2017     (H) 07/25/2016     (H) 07/27/2015    HDL 46 08/09/2017    TRIG 87 08/09/2017       No results found for: TSH, FREET4    Lab Results   Component Value Date    WBC 5.20 08/09/2017    HGB 15.9 08/09/2017    HGB 15.3 07/25/2016    HGB 15.9 07/27/2015     08/09/2017       No results found for: PSA      Imaging:         Medical Tests:         Summary of old records / correspondence / consultant report:         Request outside records:         ALLERGIES  No Known Allergies     MEDICATIONS  Current Outpatient Prescriptions   Medication Sig Dispense Refill   • cetirizine (ZyrTEC) 10 MG tablet Take 10 mg by mouth daily.     • PROAIR RESPICLICK 108 (90 Base) MCG/ACT inhaler 1-2 inhalations every 6-8 hours as needed 1 inhaler 0     No current facility-administered medications for this visit.        PFSH:     The following portions of the patient's history were reviewed and updated as appropriate: Allergies / Current Medications / Past Medical History / Surgical History / Social History / Family History    PROBLEM LIST   Patient Active Problem  List   Diagnosis   • Routine health maintenance   • Family history of GI malignancy       PAST MEDICAL HISTORY  Past Medical History:   Diagnosis Date   • Asian flu type A    • Asthma    • Earache    • Environmental and seasonal allergies        SURGICAL HISTORY  Past Surgical History:   Procedure Laterality Date   • COLONOSCOPY N/A 2/13/2017    Procedure: COLONOSCOPY TO CECUM;  Surgeon: Stephane Soto MD;  Location: [facility] ENDOSCOPY;  Service:    • DENTAL PROCEDURE     • MANDIBLE FRACTURE SURGERY         SOCIAL HISTORY  Social History     Social History   • Marital status:    • Number of children: 4     Occupational History   • Teacher      Social History Main Topics   • Smoking status: Never Smoker   • Smokeless tobacco: Never Used   • Alcohol use Yes      Comment: RARELY   • Drug use: No   • Sexual activity: Defer     Other Topics Concern   • Not on file       FAMILY HISTORY  Family History   Problem Relation Age of Onset   • Diabetes Mother    • Colon cancer Paternal Grandmother    • Colon cancer Maternal Grandmother        Health Maintenance Due   Topic   • INFLUENZA VACCINE        Overdue health maintenance interventions  reviewed with patient.    Dictated utilizing Dragon voice recognition software

## 2018-08-10 NOTE — PROGRESS NOTES
All of the enclosed lab results are acceptable. None of  the the minimally abnormal values  are of any great clinical significance. The triglycerides will improve with weight loss. Please repeat the labs in one year.Dr Wilks

## 2019-05-22 NOTE — PROGRESS NOTES
Hiram Ho / 39 y.o. / male  Encounter Date: 05/23/2019    ASSESSMENT & PLAN:    Problem List Items Addressed This Visit     None      Visit Diagnoses     Encounter to establish care    -  Primary    Acute low back pain, unspecified back pain laterality, with sciatica presence unspecified            No orders of the defined types were placed in this encounter.    No orders of the defined types were placed in this encounter.      Summary/Discussion:  ***    No Follow-up on file.  ________________________________________________________________    VITALS:    There were no vitals taken for this visit.    BP Readings from Last 3 Encounters:   01/13/19 119/58   11/30/18 110/72   08/09/18 118/74     Wt Readings from Last 3 Encounters:   01/13/19 95.3 kg (210 lb)   08/09/18 103 kg (228 lb)   05/08/18 103 kg (226 lb 6.4 oz)      There is no height or weight on file to calculate BMI.    CC: Main reason(s) for today's visit: No chief complaint on file.      HPI    Patient is a 39 y.o. male who is here to establish care with new PCP and for preventive medicine service visit.  He is a new patient to me.    Concerns today include: back pain ***    Patient Care Team:  Freddie Wilks MD (Inactive) as PCP - General  ____________________________________________________________________    REVIEW OF SYSTEMS    Review of Systems   Constitutional: Negative for activity change, appetite change, fatigue and unexpected weight change.   HENT: Negative.    Eyes: Negative.    Respiratory: Negative.    Cardiovascular: Negative.    Gastrointestinal: Negative.    Genitourinary: Negative.    Musculoskeletal: Positive for back pain. Negative for arthralgias and myalgias.   Neurological: Negative.  Negative for headaches.   Psychiatric/Behavioral: Negative.        PHYSICAL EXAMINATION    Physical Exam   Constitutional: He is oriented to person, place, and time. He appears well-developed. No distress.   Cardiovascular: Normal rate, regular rhythm  and normal heart sounds.   Pulmonary/Chest: Effort normal and breath sounds normal.   Musculoskeletal: Normal range of motion.   Neurological: He is alert and oriented to person, place, and time.   Skin: Skin is warm and dry.   Psychiatric: He has a normal mood and affect.   Vitals reviewed.    REVIEWED DATA:    Labs:   Lab Results   Component Value Date     08/09/2018    K 4.2 08/09/2018    AST 18 08/09/2018    ALT 30 08/09/2018    BUN 16 08/09/2018    CREATININE 1.01 08/09/2018    CREATININE 1.10 08/09/2017    CREATININE 0.93 07/25/2016    EGFRIFNONA 82 08/09/2018    EGFRIFAFRI 100 08/09/2018       No results found for: GLUCOSE, HGBA1C, MICROALBUR    Lab Results   Component Value Date     (H) 08/09/2018     (H) 08/09/2017     (H) 07/25/2016    HDL 34 (L) 08/09/2018    TRIG 209 (H) 08/09/2018       No results found for: TSH, FREET4       Lab Results   Component Value Date    WBC 5.60 08/09/2018    HGB 15.7 08/09/2018    HGB 15.9 08/09/2017    HGB 15.3 07/25/2016     08/09/2018         Imaging:      Medical Tests:      Summary of old records / correspondence / consultant report:      Request outside records:   ______________________________________________________________________    ALLERGIES  No Known Allergies     MEDICATIONS  Current Outpatient Medications on File Prior to Visit   Medication Sig   • azithromycin (ZITHROMAX Z-KIMBERLY) 250 MG tablet Take 2 tablets the first day, then 1 tablet daily for 4 days.   • benzonatate (TESSALON) 200 MG capsule Take 1 capsule by mouth 3 (Three) Times a Day As Needed for Cough.   • cetirizine (ZyrTEC) 10 MG tablet Take 10 mg by mouth daily.   • Chlorcyclizine-Pseudoephed (STAHIST AD) 25-60 MG tablet 1 tablet 3 times a day as needed for congestion   • fluticasone (FLONASE) 50 MCG/ACT nasal spray 2 sprays into the nostril(s) as directed by provider Daily.   • guaifenesin-codeine (GUAIFENESIN AC) 100-10 MG/5ML liquid 1-2 teaspoons PO BID  (bedtime/naptime) as needed for cough   • PROAIR RESPICLICK 108 (90 Base) MCG/ACT inhaler 1-2 inhalations every 6-8 hours as needed     No current facility-administered medications on file prior to visit.        PFSH:     The following portions of the patient's history were reviewed and updated as appropriate: Allergies / Current Medications / Past Medical History / Surgical History / Social History / Family History    PROBLEM LIST   Patient Active Problem List   Diagnosis   • Routine health maintenance   • Family history of GI malignancy       PAST MEDICAL HISTORY  Past Medical History:   Diagnosis Date   • Asian flu type A    • Asthma    • Earache    • Environmental and seasonal allergies        SURGICAL HISTORY  Past Surgical History:   Procedure Laterality Date   • COLONOSCOPY N/A 2/13/2017    Procedure: COLONOSCOPY TO CECUM;  Surgeon: Stephane Soto MD;  Location: [facility] ENDOSCOPY;  Service:    • DENTAL PROCEDURE     • MANDIBLE FRACTURE SURGERY         SOCIAL HISTORY  Social History     Socioeconomic History   • Marital status:      Spouse name: Not on file   • Number of children: 4   • Years of education: Not on file   • Highest education level: Not on file   Occupational History   • Occupation: Teacher   Tobacco Use   • Smoking status: Never Smoker   • Smokeless tobacco: Never Used   Substance and Sexual Activity   • Alcohol use: Yes     Comment: RARELY   • Drug use: No   • Sexual activity: Defer       FAMILY HISTORY  Family History   Problem Relation Age of Onset   • Diabetes Mother    • Colon cancer Paternal Grandmother    • Colon cancer Maternal Grandmother

## 2019-05-23 ENCOUNTER — OFFICE VISIT (OUTPATIENT)
Dept: INTERNAL MEDICINE | Age: 39
End: 2019-05-23

## 2019-05-23 VITALS
OXYGEN SATURATION: 98 % | TEMPERATURE: 97.8 F | BODY MASS INDEX: 28.9 KG/M2 | DIASTOLIC BLOOD PRESSURE: 80 MMHG | SYSTOLIC BLOOD PRESSURE: 122 MMHG | HEART RATE: 89 BPM | HEIGHT: 74 IN | WEIGHT: 225.2 LBS
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
## 2019-05-23 DIAGNOSIS — M54.6 ACUTE MIDLINE THORACIC BACK PAIN: Primary | ICD-10-CM

## 2019-05-23 PROCEDURE — 99214 OFFICE O/P EST MOD 30 MIN: CPT | Performed by: NURSE PRACTITIONER
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
## 2019-05-24 ENCOUNTER — TELEPHONE (OUTPATIENT)
Dept: INTERNAL MEDICINE | Age: 39
End: 2019-05-24

## 2019-05-24 ENCOUNTER — HOSPITAL ENCOUNTER (OUTPATIENT)
Dept: PHYSICAL THERAPY | Facility: HOSPITAL | Age: 39
Setting detail: THERAPIES SERIES
Discharge: HOME | End: 2019-05-24

## 2019-05-24 DIAGNOSIS — M54.6 ACUTE MIDLINE THORACIC BACK PAIN: Primary | ICD-10-CM

## 2019-05-24 PROCEDURE — 97110 THERAPEUTIC EXERCISES: CPT | Performed by: PHYSICAL THERAPIST

## 2019-05-24 PROCEDURE — 97161 PT EVAL LOW COMPLEX 20 MIN: CPT | Performed by: PHYSICAL THERAPIST

## 2019-05-24 PROCEDURE — 97012 MECHANICAL TRACTION THERAPY: CPT | Performed by: PHYSICAL THERAPIST
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
## 2019-05-24 NOTE — TELEPHONE ENCOUNTER
Patient was a prev [facility] pt and he established care with Reina yesterday. He liked Sita and wouldn't mind seeing her again if he needed to but he wants a male doctor to discuss male things with. Reina stated it was ok for him to switch as long as he knew he may have to see Sita for something acute which is fine with the pt.     I spoke to Tangela and she wanted to make sure Dr. Gabriel was ok with him switching to him. Pt would appreciate a call either way.

## 2019-05-28 ENCOUNTER — HOSPITAL ENCOUNTER (OUTPATIENT)
Dept: PHYSICAL THERAPY | Facility: HOSPITAL | Age: 39
Setting detail: THERAPIES SERIES
Discharge: HOME | End: 2019-05-28

## 2019-05-28 DIAGNOSIS — M54.6 ACUTE MIDLINE THORACIC BACK PAIN: Primary | ICD-10-CM

## 2019-05-28 PROCEDURE — G0283 ELEC STIM OTHER THAN WOUND: HCPCS | Performed by: PHYSICAL THERAPIST

## 2019-05-28 PROCEDURE — 97110 THERAPEUTIC EXERCISES: CPT | Performed by: PHYSICAL THERAPIST

## 2019-05-28 NOTE — THERAPY TREATMENT NOTE
Outpatient Physical Therapy Ortho Treatment Note   [place]     Patient Name: Hiram Ho  : 1980  MRN: [id number]  Today's Date: 2019      Visit Date: 2019    Visit Dx:    ICD-10-CM ICD-9-CM   1. Acute midline thoracic back pain M54.6 724.1       Patient Active Problem List   Diagnosis   • Routine health maintenance   • Family history of GI malignancy        Past Medical History:   Diagnosis Date   • Asian flu type A    • Asthma    • Earache    • Environmental and seasonal allergies         Past Surgical History:   Procedure Laterality Date   • COLONOSCOPY N/A 2017    Procedure: COLONOSCOPY TO CECUM;  Surgeon: Stephane Soto MD;  Location: [facility] ENDOSCOPY;  Service:    • DENTAL PROCEDURE     • MANDIBLE FRACTURE SURGERY         PT Ortho     Row Name 19 06       Head/Neck/Trunk    Trunk Extension AROM  WFL with pain  -GC    Trunk Flexion AROM  75% range with pain  -GC    Trunk Lt Lateral Flexion AROM  75% range with pain  -GC    Trunk Rt Lateral Flexion AROM  75% range with pain  -GC    Trunk Lt Rotation AROM  WFL with pain  -GC    Trunk Rt Rotation AROM  WFL with pain  -GC      User Key  (r) = Recorded By, (t) = Taken By, (c) = Cosigned By    Initials Name Provider Type    Caleb Hogan, PT Physical Therapist                      PT Assessment/Plan     Row Name 19 06          PT Assessment    Assessment Comments  Pt is responding well to therapy as he has decreased c/o pain and increased trunk ROM.  -GC        PT Plan    PT Plan Comments  Pt is to continue his HEP daily.  -GC       User Key  (r) = Recorded By, (t) = Taken By, (c) = Cosigned By    Initials Name Provider Type    Caleb Hogan, PT Physical Therapist          Modalities     Row Name 19 0600             Subjective Comments    Subjective Comments  Pt states he has felt better since starting the exercises and that Friday was the best he has felt since the accident.  -GC         Moist Heat     MH Applied  Yes  -GC      Location  thoracolumbar spine with MH with pt supine 90/90  -GC      Rx Minutes  15 mins  -GC      MH Prior to Rx  Yes  -GC         ELECTRICAL STIMULATION    Attended/Unattended  Unattended  -GC      Stimulation Type  IFC  -GC      Location/Electrode Placement/Other  thoracolumbar spine with MH and pt supine 90/90  -GC       PT E-Stim Unattended (Manual) Minutes  15  -GC        User Key  (r) = Recorded By, (t) = Taken By, (c) = Cosigned By    Initials Name Provider Type    Caleb Hogan, PT Physical Therapist        Exercises     Row Name 05/28/19 0600             Subjective Comments    Subjective Comments  Pt states he has felt better since starting the exercises and that Friday was the best he has felt since the accident.  -GC         Exercise 1    Exercise Name 1  Hamstrign stretch bilateral  -GC      Cueing 1  Verbal;Tactile  -GC      Reps 1  10  -GC      Time 1  10 secs  -GC         Exercise 2    Exercise Name 2  SKTC stretch bilateral  -GC      Cueing 2  Verbal;Tactile  -GC      Reps 2  10  -GC      Time 2  10 secs  -GC         Exercise 3    Exercise Name 3  LTR stretch bilateral  -GC      Cueing 3  Verbal;Tactile  -GC      Reps 3  10  -GC      Time 3  10 secs  -GC         Exercise 4    Exercise Name 4  Sidelying rotational stretch bilateral  -GC      Cueing 4  Verbal;Tactile  -GC      Reps 4  10  -GC      Time 4  10 secs  -GC         Exercise 5    Exercise Name 5  Prayer stretch-bilateral  -GC      Cueing 5  Verbal;Tactile  -GC      Reps 5  10  -GC      Time 5  10 secs  -GC         Exercise 6    Exercise Name 6  Levator strtch-bilateral  -GC      Cueing 6  Verbal;Demo  -GC      Reps 6  10  -GC      Time 6  10 secs  -GC         Exercise 7    Exercise Name 7  Upper Trap stretch-bilateral  -GC      Cueing 7  Verbal;Demo  -GC      Reps 7  10   -GC      Time 7  10 secs  -GC        User Key  (r) = Recorded By, (t) = Taken By, (c) = Cosigned By    Initials Name Provider Type    GC  Caleb Collado, PT Physical Therapist                                          Time Calculation:   Start Time: 0600  Stop Time: 0645  Time Calculation (min): 45 min  Therapy Charges for Today     Code Description Service Date Service Provider Modifiers Qty    63208470855  PT ELECTRICAL STIM UNATTENDED 5/28/2019 Caleb Collado, PT  1    [id number]  PT THER PROC EA 15 MIN 5/28/2019 Caleb Collado, PT GP 1                    Caleb Collado, PT  5/28/2019

## 2019-05-31 ENCOUNTER — HOSPITAL ENCOUNTER (OUTPATIENT)
Dept: PHYSICAL THERAPY | Facility: HOSPITAL | Age: 39
Setting detail: THERAPIES SERIES
Discharge: HOME | End: 2019-05-31

## 2019-05-31 ENCOUNTER — APPOINTMENT (OUTPATIENT)
Dept: PHYSICAL THERAPY | Facility: HOSPITAL | Age: 39
End: 2019-05-31

## 2019-05-31 DIAGNOSIS — M54.6 ACUTE MIDLINE THORACIC BACK PAIN: Primary | ICD-10-CM

## 2019-05-31 PROCEDURE — G0283 ELEC STIM OTHER THAN WOUND: HCPCS | Performed by: PHYSICAL THERAPIST

## 2019-05-31 PROCEDURE — 97110 THERAPEUTIC EXERCISES: CPT | Performed by: PHYSICAL THERAPIST

## 2019-06-04 ENCOUNTER — HOSPITAL ENCOUNTER (OUTPATIENT)
Dept: PHYSICAL THERAPY | Facility: HOSPITAL | Age: 39
Setting detail: THERAPIES SERIES
Discharge: HOME | End: 2019-06-04

## 2019-06-04 DIAGNOSIS — M54.6 ACUTE MIDLINE THORACIC BACK PAIN: Primary | ICD-10-CM

## 2019-06-04 PROCEDURE — G0283 ELEC STIM OTHER THAN WOUND: HCPCS | Performed by: PHYSICAL THERAPIST

## 2019-06-04 PROCEDURE — 97110 THERAPEUTIC EXERCISES: CPT | Performed by: PHYSICAL THERAPIST

## 2019-06-04 NOTE — THERAPY TREATMENT NOTE
Outpatient Physical Therapy Ortho Treatment Note   Missy Torrez     Patient Name: Hiram Ho  : 1980  MRN: [id number]  Today's Date: 2019      Visit Date: 2019    Visit Dx:    ICD-10-CM ICD-9-CM   1. Acute midline thoracic back pain M54.6 724.1       Patient Active Problem List   Diagnosis   • Routine health maintenance   • Family history of GI malignancy        Past Medical History:   Diagnosis Date   • Asian flu type A    • Asthma    • Earache    • Environmental and seasonal allergies         Past Surgical History:   Procedure Laterality Date   • COLONOSCOPY N/A 2017    Procedure: COLONOSCOPY TO CECUM;  Surgeon: Stephane Soto MD;  Location: [facility] ENDOSCOPY;  Service:    • DENTAL PROCEDURE     • MANDIBLE FRACTURE SURGERY         PT Ortho     Row Name 19 06       Subjective Comments    Subjective Comments  Pt states his back is sore and feels like he has been punched in his back.  -GC      User Key  (r) = Recorded By, (t) = Taken By, (c) = Cosigned By    Initials Name Provider Type    Caleb Hogan, PT Physical Therapist                      PT Assessment/Plan     Row Name 19 06          PT Assessment    Assessment Comments  Pt is doing well with improving function and good tolerance to light strengthening.  -GC        PT Plan    PT Plan Comments  Pt is to continue his HEP daily.  -GC       User Key  (r) = Recorded By, (t) = Taken By, (c) = Cosigned By    Initials Name Provider Type    Caleb Hogan, PT Physical Therapist          Modalities     Row Name 19 0600             Moist Heat    MH Applied  Yes  -GC      Location  thoracolumbar spine with  with pt supine 90/90  -GC      Rx Minutes  15 mins  -GC      MH Prior to Rx  Yes  -GC         ELECTRICAL STIMULATION    Attended/Unattended  Unattended  -GC      Stimulation Type  IFC  -GC      Location/Electrode Placement/Other  thoracolumbar spine with MH and pt supine 90/90  -GC       PT E-Stim  Unattended (Manual) Minutes  15  -GC        User Key  (r) = Recorded By, (t) = Taken By, (c) = Cosigned By    Initials Name Provider Type    GC Caleb Collado, MARLYS Physical Therapist        Exercises     Row Name 06/04/19 0600             Subjective Comments    Subjective Comments  Pt states his back is sore and feels like he has been punched in his back.  -GC         Exercise 1    Exercise Name 1  Hamstrign stretch bilateral  -GC      Cueing 1  Verbal;Tactile  -GC      Reps 1  10  -GC      Time 1  10 secs  -GC         Exercise 2    Exercise Name 2  SKTC stretch bilateral  -GC      Cueing 2  Verbal;Tactile  -GC      Reps 2  10  -GC      Time 2  10 secs  -GC         Exercise 3    Exercise Name 3  LTR stretch bilateral  -GC      Cueing 3  Verbal;Tactile  -GC      Reps 3  10  -GC      Time 3  10 secs  -GC         Exercise 4    Exercise Name 4  Sidelying rotational stretch bilateral  -GC      Cueing 4  Verbal;Tactile  -GC      Reps 4  10  -GC      Time 4  10 secs  -GC         Exercise 5    Exercise Name 5  Prayer stretch-bilateral  -GC      Cueing 5  Verbal;Tactile  -GC      Reps 5  10  -GC      Time 5  10 secs  -GC         Exercise 6    Exercise Name 6  Levator strtch-bilateral  -GC      Cueing 6  Verbal;Demo  -GC      Reps 6  10  -GC      Time 6  10 secs  -GC         Exercise 7    Exercise Name 7  Upper Trap stretch-bilateral  -GC      Cueing 7  Verbal;Demo  -GC      Reps 7  10   -GC      Time 7  10 secs  -GC         Exercise 8    Exercise Name 8  Prone chest raise  -GC      Cueing 8  Verbal;Tactile  -GC      Reps 8  25  -GC         Exercise 9    Exercise Name 9  Trunk rotation in standing vs theraband  -GC      Cueing 9  Verbal;Demo  -GC      Reps 9  25  -GC      Time 9  gold  -GC        User Key  (r) = Recorded By, (t) = Taken By, (c) = Cosigned By    Initials Name Provider Type    GC Caleb Collado PT Physical Therapist                                          Time Calculation:   Start Time: 0600  Stop Time:  0651  Time Calculation (min): 51 min  Therapy Charges for Today     Code Description Service Date Service Provider Modifiers Qty    63055660922 HC PT ELECTRICAL STIM UNATTENDED 6/4/2019 Caleb Collado, PT  1    57991446159 HC PT THER PROC EA 15 MIN 6/4/2019 Caleb Collado, PT GP 1                    Caleb Collado, PT  6/4/2019

## 2019-06-07 ENCOUNTER — HOSPITAL ENCOUNTER (OUTPATIENT)
Dept: PHYSICAL THERAPY | Facility: HOSPITAL | Age: 39
Setting detail: THERAPIES SERIES
Discharge: HOME | End: 2019-06-07

## 2019-06-07 DIAGNOSIS — M54.6 ACUTE MIDLINE THORACIC BACK PAIN: Primary | ICD-10-CM

## 2019-06-07 PROCEDURE — G0283 ELEC STIM OTHER THAN WOUND: HCPCS | Performed by: PHYSICAL THERAPIST

## 2019-06-07 PROCEDURE — 97110 THERAPEUTIC EXERCISES: CPT | Performed by: PHYSICAL THERAPIST

## 2019-06-07 NOTE — THERAPY TREATMENT NOTE
Outpatient Physical Therapy Ortho Treatment Note   Missy Torrez     Patient Name: Hiram Ho  : 1980  MRN: [id number]  Today's Date: 2019      Visit Date: 2019    Visit Dx:    ICD-10-CM ICD-9-CM   1. Acute midline thoracic back pain M54.6 724.1       Patient Active Problem List   Diagnosis   • Routine health maintenance   • Family history of GI malignancy        Past Medical History:   Diagnosis Date   • Asian flu type A    • Asthma    • Earache    • Environmental and seasonal allergies         Past Surgical History:   Procedure Laterality Date   • COLONOSCOPY N/A 2017    Procedure: COLONOSCOPY TO CECUM;  Surgeon: Stephane Soto MD;  Location: [facility] ENDOSCOPY;  Service:    • DENTAL PROCEDURE     • MANDIBLE FRACTURE SURGERY         PT Ortho     Row Name 19 0550       Subjective Comments    Subjective Comments  Pt states his back is feeling a little better, but still feels sore and tight.  -GC      User Key  (r) = Recorded By, (t) = Taken By, (c) = Cosigned By    Initials Name Provider Type    Caleb Hogan, PT Physical Therapist                      PT Assessment/Plan     Row Name 19 0550          PT Assessment    Assessment Comments  Pt is showing more range with his stretches today.  -GC        PT Plan    PT Plan Comments  Pt is to continue his HEP daily.  -GC       User Key  (r) = Recorded By, (t) = Taken By, (c) = Cosigned By    Initials Name Provider Type    Caleb Hogan, PT Physical Therapist          Modalities     Row Name 19 0550             Moist Heat    MH Applied  Yes  -GC      Location  thoracolumbar spine with MH with pt supine 90/90  -GC      Rx Minutes  15 mins  -GC      MH Prior to Rx  Yes  -GC         ELECTRICAL STIMULATION    Attended/Unattended  Unattended  -GC      Stimulation Type  IFC  -GC      Location/Electrode Placement/Other  thoracolumbar spine with MH and pt supine 90/90  -GC       PT E-Stim Unattended (Manual) Minutes  15   -GC        User Key  (r) = Recorded By, (t) = Taken By, (c) = Cosigned By    Initials Name Provider Type    GC Caleb Collado, MARLYS Physical Therapist        Exercises     Row Name 06/07/19 0550             Subjective Comments    Subjective Comments  Pt states his back is feeling a little better, but still feels sore and tight.  -GC         Exercise 1    Exercise Name 1  Hamstrign stretch bilateral  -GC      Cueing 1  Verbal;Tactile  -GC      Reps 1  10  -GC      Time 1  10 secs  -GC         Exercise 2    Exercise Name 2  SKTC stretch bilateral  -GC      Cueing 2  Verbal;Tactile  -GC      Reps 2  10  -GC      Time 2  10 secs  -GC         Exercise 3    Exercise Name 3  LTR stretch bilateral  -GC      Cueing 3  Verbal;Tactile  -GC      Reps 3  10  -GC      Time 3  10 secs  -GC         Exercise 4    Exercise Name 4  Sidelying rotational stretch bilateral  -GC      Cueing 4  Verbal;Tactile  -GC      Reps 4  10  -GC      Time 4  10 secs  -GC         Exercise 5    Exercise Name 5  Prayer stretch-bilateral  -GC      Cueing 5  Verbal;Tactile  -GC      Reps 5  10  -GC      Time 5  10 secs  -GC         Exercise 6    Exercise Name 6  Levator strtch-bilateral  -GC      Cueing 6  Verbal;Demo  -GC      Reps 6  10  -GC      Time 6  10 secs  -GC         Exercise 7    Exercise Name 7  Upper Trap stretch-bilateral  -GC      Cueing 7  Verbal;Demo  -GC      Reps 7  10   -GC      Time 7  10 secs  -GC         Exercise 8    Exercise Name 8  Prone chest raise  -GC      Cueing 8  Verbal;Tactile  -GC      Reps 8  25  -GC         Exercise 9    Exercise Name 9  Trunk rotation in standing vs theraband  -GC      Cueing 9  Verbal;Demo  -GC      Reps 9  25  -GC      Time 9  gold  -GC        User Key  (r) = Recorded By, (t) = Taken By, (c) = Cosigned By    Initials Name Provider Type    GC Caleb Collado, PT Physical Therapist                                          Time Calculation:   Start Time: 0550  Stop Time: 0644  Time Calculation (min): 54  min  Therapy Charges for Today     Code Description Service Date Service Provider Modifiers Qty    19147064642 HC PT ELECTRICAL STIM UNATTENDED 6/7/2019 Caleb Collado, PT  1    57983531180 HC PT THER PROC EA 15 MIN 6/7/2019 Caleb Collado, PT GP 1                    Caleb Collado, PT  6/7/2019

## 2019-06-11 ENCOUNTER — HOSPITAL ENCOUNTER (OUTPATIENT)
Dept: PHYSICAL THERAPY | Facility: HOSPITAL | Age: 39
Setting detail: THERAPIES SERIES
Discharge: HOME | End: 2019-06-11

## 2019-06-11 DIAGNOSIS — M54.6 ACUTE MIDLINE THORACIC BACK PAIN: Primary | ICD-10-CM

## 2019-06-11 PROCEDURE — 97110 THERAPEUTIC EXERCISES: CPT | Performed by: PHYSICAL THERAPIST

## 2019-06-11 PROCEDURE — G0283 ELEC STIM OTHER THAN WOUND: HCPCS | Performed by: PHYSICAL THERAPIST

## 2019-06-11 NOTE — THERAPY TREATMENT NOTE
Outpatient Physical Therapy Ortho Treatment Note   [place]     Patient Name: Hiram Ho  : 1980  MRN: [id number]  Today's Date: 2019      Visit Date: 2019    Visit Dx:    ICD-10-CM ICD-9-CM   1. Acute midline thoracic back pain M54.6 724.1       Patient Active Problem List   Diagnosis   • Routine health maintenance   • Family history of GI malignancy        Past Medical History:   Diagnosis Date   • Asian flu type A    • Asthma    • Earache    • Environmental and seasonal allergies         Past Surgical History:   Procedure Laterality Date   • COLONOSCOPY N/A 2017    Procedure: COLONOSCOPY TO CECUM;  Surgeon: Stephane Soto MD;  Location: [facility] ENDOSCOPY;  Service:    • DENTAL PROCEDURE     • MANDIBLE FRACTURE SURGERY         PT Ortho     Row Name 19       Subjective Comments    Subjective Comments  Pt states his back is feeling looser, but he tried to swing a golf club yesterday and it was still pretty painful.  -GC      User Key  (r) = Recorded By, (t) = Taken By, (c) = Cosigned By    Initials Name Provider Type    Caleb Hogan, PT Physical Therapist                      PT Assessment/Plan     Row Name 19 1130          PT Assessment    Assessment Comments  Pt is showing increaed trunk ROM and decreasing c/o pain.  -GC        PT Plan    PT Plan Comments  Pt is to continue his HEP daily.  -GC       User Key  (r) = Recorded By, (t) = Taken By, (c) = Cosigned By    Initials Name Provider Type    Caleb Hogan, PT Physical Therapist          Modalities     Row Name 19 7630             Moist Heat    MH Applied  Yes  -GC      Location  thoracolumbar spine with MH with pt supine 90/90  -GC      Rx Minutes  15 mins  -GC      MH Prior to Rx  Yes  -GC         ELECTRICAL STIMULATION    Attended/Unattended  Unattended  -GC      Stimulation Type  IFC  -GC      Location/Electrode Placement/Other  thoracolumbar spine with MH and pt supine 90/90  -GC        PT E-Stim Unattended (Manual) Minutes  15  -GC        User Key  (r) = Recorded By, (t) = Taken By, (c) = Cosigned By    Initials Name Provider Type    GC Caleb Colaldo, PT Physical Therapist        Exercises     Row Name 06/11/19 0769             Subjective Comments    Subjective Comments  Pt states his back is feeling looser, but he tried to swing a golf club yesterday and it was still pretty painful.  -GC         Exercise 1    Exercise Name 1  Hamstrign stretch bilateral  -GC      Cueing 1  Verbal;Tactile  -GC      Reps 1  10  -GC      Time 1  10 secs  -GC         Exercise 2    Exercise Name 2  SKTC stretch bilateral  -GC      Cueing 2  Verbal;Tactile  -GC      Reps 2  10  -GC      Time 2  10 secs  -GC         Exercise 3    Exercise Name 3  LTR stretch bilateral  -GC      Cueing 3  Verbal;Tactile  -GC      Reps 3  10  -GC      Time 3  10 secs  -GC         Exercise 4    Exercise Name 4  Sidelying rotational stretch bilateral  -GC      Cueing 4  Verbal;Tactile  -GC      Reps 4  10  -GC      Time 4  10 secs  -GC         Exercise 5    Exercise Name 5  Prayer stretch-bilateral  -GC      Cueing 5  Verbal;Tactile  -GC      Reps 5  10  -GC      Time 5  10 secs  -GC         Exercise 6    Exercise Name 6  Levator strtch-bilateral  -GC      Cueing 6  Verbal;Demo  -GC      Reps 6  10  -GC      Time 6  10 secs  -GC         Exercise 7    Exercise Name 7  Upper Trap stretch-bilateral  -GC      Cueing 7  Verbal;Demo  -GC      Reps 7  10   -GC      Time 7  10 secs  -GC         Exercise 8    Exercise Name 8  Prone chest raise  -GC      Cueing 8  Verbal;Tactile  -GC      Reps 8  25  -GC         Exercise 9    Exercise Name 9  Trunk rotation in standing vs theraband  -GC      Cueing 9  Verbal;Demo  -GC      Reps 9  25  -GC      Time 9  gold  -GC        User Key  (r) = Recorded By, (t) = Taken By, (c) = Cosigned By    Initials Name Provider Type    GC Caleb Collado PT Physical Therapist                                          Time  Calculation:   Start Time: 0730  Stop Time: 0827  Time Calculation (min): 57 min  Therapy Charges for Today     Code Description Service Date Service Provider Modifiers Qty    64203996113 HC PT ELECTRICAL STIM UNATTENDED 6/11/2019 Caleb Collado, PT  1    22610105655 HC PT THER PROC EA 15 MIN 6/11/2019 Caleb Collado, PT GP 1                    Caleb Collado, PT  6/11/2019

## 2019-06-14 ENCOUNTER — HOSPITAL ENCOUNTER (OUTPATIENT)
Dept: PHYSICAL THERAPY | Facility: HOSPITAL | Age: 39
Setting detail: THERAPIES SERIES
Discharge: HOME | End: 2019-06-14

## 2019-06-14 DIAGNOSIS — M54.6 ACUTE MIDLINE THORACIC BACK PAIN: Primary | ICD-10-CM

## 2019-06-14 PROCEDURE — 97110 THERAPEUTIC EXERCISES: CPT | Performed by: PHYSICAL THERAPIST

## 2019-06-14 PROCEDURE — G0283 ELEC STIM OTHER THAN WOUND: HCPCS | Performed by: PHYSICAL THERAPIST

## 2019-06-14 NOTE — THERAPY TREATMENT NOTE
Outpatient Physical Therapy Ortho Treatment Note   Missy Torrez     Patient Name: Hiram Ho  : 1980  MRN: [id number]  Today's Date: 2019      Visit Date: 2019    Visit Dx:    ICD-10-CM ICD-9-CM   1. Acute midline thoracic back pain M54.6 724.1       Patient Active Problem List   Diagnosis   • Routine health maintenance   • Family history of GI malignancy        Past Medical History:   Diagnosis Date   • Asian flu type A    • Asthma    • Earache    • Environmental and seasonal allergies         Past Surgical History:   Procedure Laterality Date   • COLONOSCOPY N/A 2017    Procedure: COLONOSCOPY TO CECUM;  Surgeon: Stephane Soto MD;  Location: [facility] ENDOSCOPY;  Service:    • DENTAL PROCEDURE     • MANDIBLE FRACTURE SURGERY         PT Ortho     Row Name 19 5161       Subjective Comments    Subjective Comments  Pt states his back is slowly getting better, but he is still cannot swing a golf club.  -GC      User Key  (r) = Recorded By, (t) = Taken By, (c) = Cosigned By    Initials Name Provider Type    Caleb Hogan, PT Physical Therapist                      PT Assessment/Plan     Row Name 19 3630          PT Assessment    Assessment Comments  Pt is doing well with increasing ROM and improving function.  -GC        PT Plan    PT Plan Comments  Pt is to continue his HEP daily.  -GC       User Key  (r) = Recorded By, (t) = Taken By, (c) = Cosigned By    Initials Name Provider Type    Caleb Hogan PT Physical Therapist          Modalities     Row Name 19 0730             Moist Heat    MH Applied  Yes  -GC      Location  thoracolumbar spine with  with pt supine 90/90  -GC      Rx Minutes  15 mins  -GC      MH Prior to Rx  Yes  -GC         ELECTRICAL STIMULATION    Attended/Unattended  Unattended  -GC      Stimulation Type  IFC  -GC      Location/Electrode Placement/Other  thoracolumbar spine with MH and pt supine 90/90  -GC       PT E-Stim Unattended  (Manual) Minutes  15  -GC        User Key  (r) = Recorded By, (t) = Taken By, (c) = Cosigned By    Initials Name Provider Type    Caleb Hogan, MARLYS Physical Therapist        Exercises     Row Name 06/14/19 0727             Subjective Comments    Subjective Comments  Pt states his back is slowly getting better, but he is still cannot swing a golf club.  -GC         Exercise 1    Exercise Name 1  Hamstrign stretch bilateral  -GC      Cueing 1  Verbal;Tactile  -GC      Reps 1  10  -GC      Time 1  10 secs  -GC         Exercise 2    Exercise Name 2  SKTC stretch bilateral  -GC      Cueing 2  Verbal;Tactile  -GC      Reps 2  10  -GC      Time 2  10 secs  -GC         Exercise 3    Exercise Name 3  LTR stretch bilateral  -GC      Cueing 3  Verbal;Tactile  -GC      Reps 3  10  -GC      Time 3  10 secs  -GC         Exercise 4    Exercise Name 4  Sidelying rotational stretch bilateral  -GC      Cueing 4  Verbal;Tactile  -GC      Reps 4  10  -GC      Time 4  10 secs  -GC         Exercise 5    Exercise Name 5  Prayer stretch-bilateral  -GC      Cueing 5  Verbal;Tactile  -GC      Reps 5  10  -GC      Time 5  10 secs  -GC         Exercise 6    Exercise Name 6  Levator strtch-bilateral  -GC      Cueing 6  Verbal;Demo  -GC      Reps 6  10  -GC      Time 6  10 secs  -GC         Exercise 7    Exercise Name 7  Upper Trap stretch-bilateral  -GC      Cueing 7  Verbal;Demo  -GC      Reps 7  10   -GC      Time 7  10 secs  -GC         Exercise 8    Exercise Name 8  Prone chest raise  -GC      Cueing 8  Verbal;Tactile  -GC      Reps 8  25  -GC         Exercise 9    Exercise Name 9  Trunk rotation in standing vs theraband  -GC      Cueing 9  Verbal;Demo  -GC      Reps 9  25  -GC      Time 9  gold  -GC         Exercise 10    Exercise Name 10  Prone press up  -GC      Cueing 10  Verbal;Tactile  -GC      Reps 10  20  -GC        User Key  (r) = Recorded By, (t) = Taken By, (c) = Cosigned By    Initials Name Provider Type    JEFFERSON Collado  Caleb, PT Physical Therapist                                          Time Calculation:   Start Time: 0730  Stop Time: 0842  Time Calculation (min): 72 min  Therapy Charges for Today     Code Description Service Date Service Provider Modifiers Qty    26808609445  PT THER PROC EA 15 MIN 6/14/2019 Caleb Collado, PT GP 2    [id number]  PT ELECTRICAL STIM UNATTENDED 6/14/2019 Caleb Collado, PT  1                    Caleb Collado PT  6/14/2019

## 2019-06-18 ENCOUNTER — HOSPITAL ENCOUNTER (OUTPATIENT)
Dept: PHYSICAL THERAPY | Facility: HOSPITAL | Age: 39
Setting detail: THERAPIES SERIES
Discharge: HOME | End: 2019-06-18

## 2019-06-18 DIAGNOSIS — M54.6 ACUTE MIDLINE THORACIC BACK PAIN: Primary | ICD-10-CM

## 2019-06-18 PROCEDURE — 97110 THERAPEUTIC EXERCISES: CPT | Performed by: PHYSICAL THERAPIST

## 2019-06-18 PROCEDURE — 97012 MECHANICAL TRACTION THERAPY: CPT | Performed by: PHYSICAL THERAPIST

## 2019-06-18 NOTE — THERAPY TREATMENT NOTE
Outpatient Physical Therapy Ortho Treatment Note   Missy Torrez     Patient Name: Hiram Ho  : 1980  MRN: [id number]  Today's Date: 2019      Visit Date: 2019    Visit Dx:    ICD-10-CM ICD-9-CM   1. Acute midline thoracic back pain M54.6 724.1       Patient Active Problem List   Diagnosis   • Routine health maintenance   • Family history of GI malignancy        Past Medical History:   Diagnosis Date   • Asian flu type A    • Asthma    • Earache    • Environmental and seasonal allergies         Past Surgical History:   Procedure Laterality Date   • COLONOSCOPY N/A 2017    Procedure: COLONOSCOPY TO CECUM;  Surgeon: Stephane Soto MD;  Location: [facility] ENDOSCOPY;  Service:    • DENTAL PROCEDURE     • MANDIBLE FRACTURE SURGERY         PT Ortho     Row Name 19 0740       Subjective Comments    Subjective Comments  Pt states his back is feeling better.  -GC      User Key  (r) = Recorded By, (t) = Taken By, (c) = Cosigned By    Initials Name Provider Type    Caleb Hogan, PT Physical Therapist                      PT Assessment/Plan     Row Name 19 0740          PT Assessment    Assessment Comments  Pt is doing well with decreasing c/o pain and improving function.  -GC        PT Plan    PT Plan Comments  Pt is to continue his HEP daily.  -GC       User Key  (r) = Recorded By, (t) = Taken By, (c) = Cosigned By    Initials Name Provider Type    Caleb Hogan, PT Physical Therapist          Modalities     Row Name 19 0740             Moist Heat    MH Applied  Yes  -GC      Location  thoracolumbar spine with  with pt supine 90/90  -GC      Rx Minutes  15 mins  -GC      MH Prior to Rx  Yes  -GC         ELECTRICAL STIMULATION    Attended/Unattended  Unattended  -GC      Stimulation Type  IFC  -GC      Location/Electrode Placement/Other  thoracolumbar spine with MH and pt supine 90/90  -GC        User Key  (r) = Recorded By, (t) = Taken By, (c) = Cosigned By    Initials  Name Provider Type     Caleb Collado PT Physical Therapist        Exercises     Row Name 06/18/19 0740             Subjective Comments    Subjective Comments  Pt states his back is feeling better.  -GC         Exercise 1    Exercise Name 1  Hamstring stretch bilateral  -GC      Cueing 1  Verbal;Tactile  -GC      Reps 1  10  -GC      Time 1  10 secs  -GC         Exercise 2    Exercise Name 2  SKTC stretch bilateral  -GC      Cueing 2  Verbal;Tactile  -GC      Reps 2  10  -GC      Time 2  10 secs  -GC         Exercise 3    Exercise Name 3  LTR stretch bilateral  -GC      Cueing 3  Verbal;Tactile  -GC      Reps 3  10  -GC      Time 3  10 secs  -GC         Exercise 4    Exercise Name 4  Sidelying rotational stretch bilateral  -GC      Cueing 4  Verbal;Tactile  -GC      Reps 4  10  -GC      Time 4  10 secs  -GC         Exercise 5    Exercise Name 5  Prayer stretch-bilateral  -GC      Cueing 5  Verbal;Tactile  -GC      Reps 5  10  -GC      Time 5  10 secs  -GC         Exercise 6    Exercise Name 6  Levator strtch-bilateral  -GC      Cueing 6  Verbal;Demo  -GC      Reps 6  10  -GC      Time 6  10 secs  -GC         Exercise 7    Exercise Name 7  Upper Trap stretch-bilateral  -GC      Cueing 7  Verbal;Demo  -GC      Reps 7  10   -GC      Time 7  10 secs  -GC         Exercise 8    Exercise Name 8  Prone chest raise  -GC      Cueing 8  Verbal;Tactile  -GC      Reps 8  25  -GC         Exercise 9    Exercise Name 9  Trunk rotation in standing vs theraband  -GC      Cueing 9  Verbal;Demo  -GC      Reps 9  25  -GC      Time 9  gold  -GC         Exercise 10    Exercise Name 10  Prone press up  -GC      Cueing 10  Verbal;Tactile  -GC      Reps 10  20  -GC        User Key  (r) = Recorded By, (t) = Taken By, (c) = Cosigned By    Initials Name Provider Type     Caleb Collado PT Physical Therapist                                          Time Calculation:   Start Time: 0740  Stop Time: 0828  Time Calculation (min): 48  min  Therapy Charges for Today     Code Description Service Date Service Provider Modifiers Qty    33486510611  PT THER PROC EA 15 MIN 6/18/2019 Caleb Collado, PT GP 1    [id number]  PT-TRACTION MECHANICAL 6/18/2019 Caleb Collado, PT  1                    Caleb Collado, PT  6/18/2019

## 2019-06-20 ENCOUNTER — HOSPITAL ENCOUNTER (OUTPATIENT)
Dept: PHYSICAL THERAPY | Facility: HOSPITAL | Age: 39
Setting detail: THERAPIES SERIES
Discharge: HOME | End: 2019-06-20

## 2019-06-20 DIAGNOSIS — M54.6 ACUTE MIDLINE THORACIC BACK PAIN: Primary | ICD-10-CM

## 2019-06-20 PROCEDURE — 97110 THERAPEUTIC EXERCISES: CPT

## 2019-06-20 NOTE — THERAPY TREATMENT NOTE
Outpatient Physical Therapy Ortho Treatment Note   Missy Torrez     Patient Name: Hiram Ho  : 1980  MRN: [id number]  Today's Date: 2019      Visit Date: 2019    Visit Dx:    ICD-10-CM ICD-9-CM   1. Acute midline thoracic back pain M54.6 724.1       Patient Active Problem List   Diagnosis   • Routine health maintenance   • Family history of GI malignancy        Past Medical History:   Diagnosis Date   • Asian flu type A    • Asthma    • Earache    • Environmental and seasonal allergies         Past Surgical History:   Procedure Laterality Date   • COLONOSCOPY N/A 2017    Procedure: COLONOSCOPY TO CECUM;  Surgeon: Stephane Soto MD;  Location: [facility] ENDOSCOPY;  Service:    • DENTAL PROCEDURE     • MANDIBLE FRACTURE SURGERY         PT Ortho     Row Name 19 0740       Subjective Comments    Subjective Comments  Pt states his back is feeling better.  -GC      User Key  (r) = Recorded By, (t) = Taken By, (c) = Cosigned By    Initials Name Provider Type    Caleb Hogan, PT Physical Therapist                      PT Assessment/Plan     Row Name 19 0805          PT Assessment    Assessment Comments  Pt completes treatment plan well with reports of decreased symptoms at completion of session  -KM        PT Plan    PT Plan Comments  Continue per POC  -KM       User Key  (r) = Recorded By, (t) = Taken By, (c) = Cosigned By    Initials Name Provider Type    Arcelia Galvez, PTA Physical Therapy Assistant          Modalities     Row Name 19 0805             Subjective Comments    Subjective Comments  Pt states he continues to notice improvement and has decreased tightness.  -KM         Moist Heat    MH Applied  Yes  -KM      Location  thoracolumbar spine with MH with pt supine 90/90  -KM      Rx Minutes  15 mins  -KM      MH Prior to Rx  Yes  -KM         ELECTRICAL STIMULATION    Attended/Unattended  Unattended  -KM      Stimulation Type  IFC  -KM      Location/Electrode  Placement/Other  thoracolumbar spine with MH and pt supine 90/90  -KM        User Key  (r) = Recorded By, (t) = Taken By, (c) = Cosigned By    Initials Name Provider Type    Arcelia Galvez PTA Physical Therapy Assistant        Exercises     Row Name 06/20/19 0805             Subjective Comments    Subjective Comments  Pt states he continues to notice improvement and has decreased tightness.  -KM         Exercise 1    Exercise Name 1  Hamstring stretch bilateral  -KM      Cueing 1  Verbal;Tactile  -KM      Reps 1  10  -KM      Time 1  10 secs  -KM         Exercise 2    Exercise Name 2  SKTC stretch bilateral  -KM      Cueing 2  Verbal;Tactile  -KM      Reps 2  10  -KM      Time 2  10 secs  -KM         Exercise 3    Exercise Name 3  LTR stretch bilateral  -KM      Cueing 3  Verbal;Tactile  -KM      Reps 3  10  -KM      Time 3  10 secs  -KM         Exercise 4    Exercise Name 4  Sidelying rotational stretch bilateral  -KM      Cueing 4  Verbal;Tactile  -KM      Reps 4  10  -KM      Time 4  10 secs  -KM         Exercise 5    Exercise Name 5  Prayer stretch-bilateral  -KM      Cueing 5  Verbal;Tactile  -KM      Reps 5  10  -KM      Time 5  10 secs  -KM         Exercise 6    Exercise Name 6  Levator strtch-bilateral  -KM      Cueing 6  Verbal;Demo  -KM      Reps 6  10  -KM      Time 6  10 secs  -KM         Exercise 7    Exercise Name 7  Upper Trap stretch-bilateral  -KM      Cueing 7  Verbal;Demo  -KM      Reps 7  10   -KM      Time 7  10 secs  -KM         Exercise 8    Exercise Name 8  Prone chest raise  -KM      Cueing 8  Verbal;Tactile  -KM      Reps 8  25  -KM         Exercise 9    Exercise Name 9  Trunk rotation in standing vs theraband  -KM      Cueing 9  Verbal;Demo  -KM      Reps 9  25  -KM      Time 9  gold  -KM         Exercise 10    Exercise Name 10  Prone press up  -KM      Cueing 10  Verbal;Tactile  -KM      Reps 10  20  -KM        User Key  (r) = Recorded By, (t) = Taken By, (c) = Cosigned By    Initials  Name Provider Type    Arcelia Galvez PTA Physical Therapy Assistant                                          Time Calculation:   Start Time: 0805  Stop Time: 0854  Time Calculation (min): 49 min  Therapy Charges for Today     Code Description Service Date Service Provider Modifiers Qty    68431919012 HC PT THER PROC EA 15 MIN 6/20/2019 Arcelia Johnson PTA GP 1                    Arcelia Johnson PTA  6/20/2019

## 2019-06-25 ENCOUNTER — APPOINTMENT (OUTPATIENT)
Dept: PHYSICAL THERAPY | Facility: HOSPITAL | Age: 39
End: 2019-06-25

## 2019-06-26 ENCOUNTER — APPOINTMENT (OUTPATIENT)
Dept: PHYSICAL THERAPY | Facility: HOSPITAL | Age: 39
End: 2019-06-26

## 2019-06-28 ENCOUNTER — HOSPITAL ENCOUNTER (OUTPATIENT)
Dept: PHYSICAL THERAPY | Facility: HOSPITAL | Age: 39
Setting detail: THERAPIES SERIES
Discharge: HOME | End: 2019-06-28

## 2019-06-28 DIAGNOSIS — M54.6 ACUTE MIDLINE THORACIC BACK PAIN: Primary | ICD-10-CM

## 2019-06-28 PROCEDURE — 97110 THERAPEUTIC EXERCISES: CPT

## 2019-06-28 NOTE — THERAPY TREATMENT NOTE
Outpatient Physical Therapy Ortho Treatment Note   [place]     Patient Name: Hiram oH  : 1980  MRN: [id number]  Today's Date: 2019      Visit Date: 2019    Visit Dx:    ICD-10-CM ICD-9-CM   1. Acute midline thoracic back pain M54.6 724.1       Patient Active Problem List   Diagnosis   • Routine health maintenance   • Family history of GI malignancy        Past Medical History:   Diagnosis Date   • Asian flu type A    • Asthma    • Earache    • Environmental and seasonal allergies         Past Surgical History:   Procedure Laterality Date   • COLONOSCOPY N/A 2017    Procedure: COLONOSCOPY TO CECUM;  Surgeon: Stephane Soto MD;  Location: [facility] ENDOSCOPY;  Service:    • DENTAL PROCEDURE     • MANDIBLE FRACTURE SURGERY                         PT Assessment/Plan     Row Name 19 0835          PT Assessment    Assessment Comments  Pt completes treatment plan well and reports improved symptoms at completion of session.   -KM        PT Plan    PT Plan Comments  Continue per POC  -KM       User Key  (r) = Recorded By, (t) = Taken By, (c) = Cosigned By    Initials Name Provider Type    Arcelia Galvez PTA Physical Therapy Assistant          Modalities     Row Name 19 0835             Subjective Comments    Subjective Comments  Pt states he continues to notices improvement, states his pain level has decreased from his initial visit from 5/10 to 2/10. States he is still unable to swing a golf club and  his kids without increasing symptoms.   -KM         Moist Heat    MH Applied  Yes  -KM      Location  thoracolumbar spine with MH with pt supine 90/90  -KM      Rx Minutes  15 mins  -KM      MH Prior to Rx  Yes  -KM         ELECTRICAL STIMULATION    Attended/Unattended  Unattended  -KM      Stimulation Type  IFC  -KM      Location/Electrode Placement/Other  thoracolumbar spine with MH and pt supine 90/90  -KM        User Key  (r) = Recorded By, (t) = Taken By, (c) =  Cosigned By    Initials Name Provider Type    Arcelia Galvez, JADE Physical Therapy Assistant        Exercises     Row Name 06/28/19 0835             Subjective Comments    Subjective Comments  Pt states he continues to notices improvement, states his pain level has decreased from his initial visit from 5/10 to 2/10. States he is still unable to swing a golf club and  his kids without increasing symptoms.   -KM         Exercise 1    Exercise Name 1  Hamstring stretch bilateral  -KM      Cueing 1  Verbal;Tactile  -KM      Reps 1  10  -KM      Time 1  10 secs  -KM         Exercise 2    Exercise Name 2  SKTC stretch bilateral  -KM      Cueing 2  Verbal;Tactile  -KM      Reps 2  10  -KM      Time 2  10 secs  -KM         Exercise 3    Exercise Name 3  LTR stretch bilateral  -KM      Cueing 3  Verbal;Tactile  -KM      Reps 3  10  -KM      Time 3  10 secs  -KM         Exercise 4    Exercise Name 4  Sidelying rotational stretch bilateral  -KM      Cueing 4  Verbal;Tactile  -KM      Reps 4  10  -KM      Time 4  10 secs  -KM         Exercise 5    Exercise Name 5  Prayer stretch-bilateral  -KM      Cueing 5  Verbal;Tactile  -KM      Reps 5  10  -KM      Time 5  10 secs  -KM         Exercise 6    Exercise Name 6  Levator strtch-bilateral  -KM      Cueing 6  Verbal;Demo  -KM      Reps 6  10  -KM      Time 6  10 secs  -KM         Exercise 7    Exercise Name 7  Upper Trap stretch-bilateral  -KM      Cueing 7  Verbal;Demo  -KM      Reps 7  10   -KM      Time 7  10 secs  -KM         Exercise 8    Exercise Name 8  Prone chest raise  -KM      Cueing 8  Verbal;Tactile  -KM      Reps 8  25  -KM         Exercise 9    Exercise Name 9  Trunk rotation in standing vs theraband  -KM      Cueing 9  Verbal;Demo  -KM      Reps 9  25  -KM      Time 9  gold  -KM         Exercise 10    Exercise Name 10  Prone press up  -KM      Cueing 10  Verbal;Tactile  -KM      Reps 10  20  -KM        User Key  (r) = Recorded By, (t) = Taken By, (c) =  Cosigned By    Initials Name Provider Type    Arcelia Galvez PTA Physical Therapy Assistant                                          Time Calculation:   Start Time: 0835  Stop Time: 0920  Time Calculation (min): 45 min  Therapy Charges for Today     Code Description Service Date Service Provider Modifiers Qty    99661288992 HC PT THER PROC EA 15 MIN 6/28/2019 Arcelia Johnson PTA GP 1                    Arcelia Johnson PTA  6/28/2019

## 2019-07-01 ENCOUNTER — HOSPITAL ENCOUNTER (OUTPATIENT)
Dept: PHYSICAL THERAPY | Facility: HOSPITAL | Age: 39
Setting detail: THERAPIES SERIES
Discharge: HOME | End: 2019-07-01

## 2019-07-01 DIAGNOSIS — M54.6 ACUTE MIDLINE THORACIC BACK PAIN: Primary | ICD-10-CM

## 2019-07-01 PROCEDURE — G0283 ELEC STIM OTHER THAN WOUND: HCPCS

## 2019-07-01 PROCEDURE — 97110 THERAPEUTIC EXERCISES: CPT

## 2019-07-02 NOTE — THERAPY TREATMENT NOTE
Outpatient Physical Therapy Ortho Treatment Note   Missy Torrez     Patient Name: Hiram Ho  : 1980  MRN: [id number]  Today's Date: 2019      Visit Date: 2019    Visit Dx:    ICD-10-CM ICD-9-CM   1. Acute midline thoracic back pain M54.6 724.1       Patient Active Problem List   Diagnosis   • Routine health maintenance   • Family history of GI malignancy        Past Medical History:   Diagnosis Date   • Asian flu type A    • Asthma    • Earache    • Environmental and seasonal allergies         Past Surgical History:   Procedure Laterality Date   • COLONOSCOPY N/A 2017    Procedure: COLONOSCOPY TO CECUM;  Surgeon: Stephane Soto MD;  Location: [facility] ENDOSCOPY;  Service:    • DENTAL PROCEDURE     • MANDIBLE FRACTURE SURGERY         PT Ortho     Row Name 19 1400       Cervical Palpation    Upper Traps  Bilateral:;Tender;Guarded/taut  -GC    Middle Traps  Bilateral:;Tender;Guarded/taut  -GC       Lumbosacral Palpation    Quadratus Lumborum  Bilateral:;Tender;Guarded/taut  -GC    Erector Spinae (Paraspinals)  Bilateral:;Tender;Guarded/taut  -GC       Head/Neck/Trunk    Neck Flexion AROM  WFL  -GC    Row Name 19 0835       General ROM    GENERAL ROM COMMENTS  Pt does not have any real pain with ROM assessment, just c/o stiffness and tightness  -GC       Head/Neck/Trunk    Neck Flexion AROM  WFL  -GC    Neck Lt Lateral Flexion AROM  75% range  -GC    Neck Rt Lateral Flexion AROM  75% range  -GC    Neck Lt Rotation AROM  75% range  -GC    Neck Rt Rotation AROM  75% range  -GC    Trunk Extension AROM  WFL  -GC    Trunk Flexion AROM  WFL  -GC    Trunk Lt Lateral Flexion AROM  75% range  -GC    Trunk Rt Lateral Flexion AROM  75% range  -GC    Trunk Lt Rotation AROM  WFL  -GC    Trunk Rt Rotation AROM  WFL  -GC       Upper Extremity Flexibility    Upper Trapezius  Bilateral:;Mildly limited  -GC    Levator Scapula  Bilateral:;Mildly limited  -GC       Lower Extremity Flexibility     Hamstrings  Bilateral:;Mildly limited  -GC    Hip External Rotators  Bilateral:;Mildly limited  -GC    Quadratus Lumborum  Bilateral:;Mildly limited  -GC      User Key  (r) = Recorded By, (t) = Taken By, (c) = Cosigned By    Initials Name Provider Type    Caleb Hogan, PT Physical Therapist                      PT Assessment/Plan     Row Name 07/01/19 0835          PT Assessment    Assessment Comments  Pt continues to make progress toward goals in regards to increased flexibility and decreased pain. Pt completes current treatment plan well with improved symptoms at completion of session.   -KM        PT Plan    PT Plan Comments  Assess pts response to golf and ADLs. Continue per POC  -KM       User Key  (r) = Recorded By, (t) = Taken By, (c) = Cosigned By    Initials Name Provider Type    Arcelia Galvez PTA Physical Therapy Assistant          Modalities     Row Name 07/01/19 0835             Moist Heat    MH Applied  Yes  -KM      Location  thoracolumbar spine with MH with pt supine 90/90  -KM      Rx Minutes  15 mins  -KM      MH Prior to Rx  Yes  -KM         ELECTRICAL STIMULATION    Attended/Unattended  Unattended  -KM      Stimulation Type  IFC  -KM      Location/Electrode Placement/Other  thoracolumbar spine with MH and pt supine 90/90  -KM        User Key  (r) = Recorded By, (t) = Taken By, (c) = Cosigned By    Initials Name Provider Type    Arcelia Galvez PTA Physical Therapy Assistant        Exercises     Row Name 07/01/19 0835             Subjective Comments    Subjective Comments  Pt states he continues to notice improvement, but is still hesitant to complete certain activities due to stiffness  -KM         Exercise 1    Exercise Name 1  Hamstring stretch bilateral  -KM      Cueing 1  Verbal;Tactile  -KM      Reps 1  10  -KM      Time 1  10 secs  -KM         Exercise 2    Exercise Name 2  SKTC stretch bilateral  -KM      Cueing 2  Verbal;Tactile  -KM      Reps 2  10  -KM      Time 2  10 secs   -KM         Exercise 3    Exercise Name 3  LTR stretch bilateral  -KM      Cueing 3  Verbal;Tactile  -KM      Reps 3  10  -KM      Time 3  10 secs  -KM         Exercise 4    Exercise Name 4  Sidelying rotational stretch bilateral  -KM      Cueing 4  Verbal;Tactile  -KM      Reps 4  10  -KM      Time 4  10 secs  -KM         Exercise 5    Exercise Name 5  Prayer stretch-bilateral  -KM      Cueing 5  Verbal;Tactile  -KM      Reps 5  10  -KM      Time 5  10 secs  -KM         Exercise 6    Exercise Name 6  Levator strtch-bilateral  -KM      Cueing 6  Verbal;Demo  -KM      Reps 6  10  -KM      Time 6  10 secs  -KM         Exercise 7    Exercise Name 7  Upper Trap stretch-bilateral  -KM      Cueing 7  Verbal;Demo  -KM      Reps 7  10   -KM      Time 7  10 secs  -KM         Exercise 8    Exercise Name 8  Prone chest raise  -KM      Cueing 8  Verbal;Tactile  -KM      Reps 8  25  -KM         Exercise 9    Exercise Name 9  Trunk rotation in standing vs theraband  -KM      Cueing 9  Verbal;Demo  -KM      Reps 9  25  -KM      Time 9  gold  -KM         Exercise 10    Exercise Name 10  Prone press up  -KM      Cueing 10  Verbal;Tactile  -KM      Reps 10  20  -KM        User Key  (r) = Recorded By, (t) = Taken By, (c) = Cosigned By    Initials Name Provider Type    Arcelia Galvez PTA Physical Therapy Assistant                                          Time Calculation:   Start Time: 0835  Stop Time: 0940  Time Calculation (min): 65 min  Therapy Charges for Today     Code Description Service Date Service Provider Modifiers Qty    40091705966 HC PT THER PROC EA 15 MIN 7/1/2019 Arcelia Johnson PTA GP 1    92355310344 HC PT ELECTRICAL STIM UNATTENDED 7/1/2019 Arcelia Johnson PTA  1                    Arcelia Johnson PTA  7/2/2019

## 2019-07-05 ENCOUNTER — HOSPITAL ENCOUNTER (OUTPATIENT)
Dept: PHYSICAL THERAPY | Facility: HOSPITAL | Age: 39
Setting detail: THERAPIES SERIES
Discharge: HOME | End: 2019-07-05

## 2019-07-05 DIAGNOSIS — M54.6 ACUTE MIDLINE THORACIC BACK PAIN: Primary | ICD-10-CM

## 2019-07-05 PROCEDURE — 97110 THERAPEUTIC EXERCISES: CPT

## 2019-07-05 NOTE — THERAPY TREATMENT NOTE
Outpatient Physical Therapy Ortho Treatment Note   [place]     Patient Name: Hiram Ho  : 1980  MRN: [id number]  Today's Date: 2019      Visit Date: 2019    Visit Dx:    ICD-10-CM ICD-9-CM   1. Acute midline thoracic back pain M54.6 724.1       Patient Active Problem List   Diagnosis   • Routine health maintenance   • Family history of GI malignancy        Past Medical History:   Diagnosis Date   • Asian flu type A    • Asthma    • Earache    • Environmental and seasonal allergies         Past Surgical History:   Procedure Laterality Date   • COLONOSCOPY N/A 2017    Procedure: COLONOSCOPY TO CECUM;  Surgeon: Stephane Soto MD;  Location: [facility] ENDOSCOPY;  Service:    • DENTAL PROCEDURE     • MANDIBLE FRACTURE SURGERY                         PT Assessment/Plan     Row Name 19          PT Assessment    Assessment Comments  Additional strengthen exercises initiated to focus on posterior stability. Pt reports soreness but feels better than when he came in.   -KM        PT Plan    PT Plan Comments  Assess pts response to golf. Progress as tolerated.   -KM       User Key  (r) = Recorded By, (t) = Taken By, (c) = Cosigned By    Initials Name Provider Type    Arcelia Galvez PTA Physical Therapy Assistant          Modalities     Row Name 19 6823             Moist Heat    MH Applied  Yes  -KM      Location  thoracolumbar spine with MH with pt supine 90/90  -KM      Rx Minutes  15 mins  -KM      MH Prior to Rx  Yes  -KM         ELECTRICAL STIMULATION    Attended/Unattended  Unattended  -KM      Stimulation Type  IFC  -KM      Location/Electrode Placement/Other  thoracolumbar spine with MH and pt supine 90/90  -KM        User Key  (r) = Recorded By, (t) = Taken By, (c) = Cosigned By    Initials Name Provider Type    Arcelia Galvez PTA Physical Therapy Assistant        Exercises     Row Name 19 2719             Subjective Comments    Subjective Comments  Pt  states he completed additional acitivity the past couple of days including golf and picking up his daughter. He states he was able to complete the tasks with soreness B. medial scapula. He states he plans to go to the driving range today.   -KM         Exercise 1    Exercise Name 1  Hamstring stretch bilateral  -KM      Cueing 1  Verbal;Tactile  -KM      Reps 1  10  -KM      Time 1  10 secs  -KM         Exercise 2    Exercise Name 2  SKTC stretch bilateral  -KM      Cueing 2  Verbal;Tactile  -KM      Reps 2  10  -KM      Time 2  10 secs  -KM         Exercise 3    Exercise Name 3  LTR stretch bilateral  -KM      Cueing 3  Verbal;Tactile  -KM      Reps 3  10  -KM      Time 3  10 secs  -KM         Exercise 4    Exercise Name 4  Sidelying rotational stretch bilateral  -KM      Cueing 4  Verbal;Tactile  -KM      Reps 4  10  -KM      Time 4  10 secs  -KM         Exercise 5    Exercise Name 5  Prayer stretch-bilateral  -KM      Cueing 5  Verbal;Tactile  -KM      Reps 5  10  -KM      Time 5  10 secs  -KM         Exercise 6    Exercise Name 6  Levator strtch-bilateral  -KM      Cueing 6  Verbal;Demo  -KM      Reps 6  10  -KM      Time 6  10 secs  -KM         Exercise 7    Exercise Name 7  Upper Trap stretch-bilateral  -KM      Cueing 7  Verbal;Demo  -KM      Reps 7  10   -KM      Time 7  10 secs  -KM         Exercise 8    Exercise Name 8  Prone chest raise  -KM      Cueing 8  Verbal;Tactile  -KM      Reps 8  25  -KM         Exercise 9    Exercise Name 9  Trunk rotation in standing vs theraband  -KM      Cueing 9  Verbal;Demo  -KM      Reps 9  25  -KM      Time 9  gold  -KM         Exercise 10    Exercise Name 10  Prone press up  -KM      Cueing 10  Verbal;Tactile  -KM      Reps 10  20  -KM         Exercise 11    Exercise Name 11  TB Rows  -KM      Reps 11  25  -KM      Time 11  Silver  -KM         Exercise 12    Exercise Name 12  TB Ext  -KM      Reps 12  25  -KM      Time 12  Silver  -KM        User Key  (r) = Recorded By,  (t) = Taken By, (c) = Cosigned By    Initials Name Provider Type    Arcelia Galvez PTA Physical Therapy Assistant                                          Time Calculation:   Start Time: 0830  Stop Time: 0930  Time Calculation (min): 60 min  Therapy Charges for Today     Code Description Service Date Service Provider Modifiers Qty    51344686374  PT THER PROC EA 15 MIN 7/5/2019 Arcelia Johnson PTA GP 1                    Arcelia Johnson PTA  7/5/2019

## 2019-07-08 ENCOUNTER — HOSPITAL ENCOUNTER (OUTPATIENT)
Dept: PHYSICAL THERAPY | Facility: HOSPITAL | Age: 39
Setting detail: THERAPIES SERIES
Discharge: HOME | End: 2019-07-08

## 2019-07-08 DIAGNOSIS — M54.6 ACUTE MIDLINE THORACIC BACK PAIN: Primary | ICD-10-CM

## 2019-07-08 PROCEDURE — 97110 THERAPEUTIC EXERCISES: CPT

## 2019-07-08 NOTE — THERAPY TREATMENT NOTE
Outpatient Physical Therapy Ortho Treatment Note   [place]     Patient Name: Hiram Ho  : 1980  MRN: [id number]  Today's Date: 2019      Visit Date: 2019    Visit Dx:    ICD-10-CM ICD-9-CM   1. Acute midline thoracic back pain M54.6 724.1       Patient Active Problem List   Diagnosis   • Routine health maintenance   • Family history of GI malignancy        Past Medical History:   Diagnosis Date   • Asian flu type A    • Asthma    • Earache    • Environmental and seasonal allergies         Past Surgical History:   Procedure Laterality Date   • COLONOSCOPY N/A 2017    Procedure: COLONOSCOPY TO CECUM;  Surgeon: Stephane Soto MD;  Location: [facility] ENDOSCOPY;  Service:    • DENTAL PROCEDURE     • MANDIBLE FRACTURE SURGERY                         PT Assessment/Plan     Row Name 1935          PT Assessment    Assessment Comments  Pt tolerated treatment plan well and states he felt better at completion of session.   -KM        PT Plan    PT Plan Comments  Continue per POC  -KM       User Key  (r) = Recorded By, (t) = Taken By, (c) = Cosigned By    Initials Name Provider Type    Arcelia Galvez PTA Physical Therapy Assistant          Modalities     Row Name 19 0835             Moist Heat    MH Applied  Yes  -KM      Location  thoracolumbar spine with MH with pt supine 90/90  -KM      Rx Minutes  15 mins  -KM      MH Prior to Rx  Yes  -KM         ELECTRICAL STIMULATION    Attended/Unattended  Unattended  -KM      Stimulation Type  IFC  -KM      Location/Electrode Placement/Other  thoracolumbar spine with MH and pt supine 90/90  -KM        User Key  (r) = Recorded By, (t) = Taken By, (c) = Cosigned By    Initials Name Provider Type    Arcelia Galvez PTA Physical Therapy Assistant        Exercises     Row Name 19 0835             Subjective Comments    Subjective Comments  Pt states he was able to play golf and complete yard work pain free, he just experienced  soreness afterwards. States he continues to complete HEP  -KM         Exercise 1    Exercise Name 1  Hamstring stretch bilateral  -KM      Cueing 1  Verbal;Tactile  -KM      Reps 1  10  -KM      Time 1  10 secs  -KM         Exercise 2    Exercise Name 2  SKTC stretch bilateral  -KM      Cueing 2  Verbal;Tactile  -KM      Reps 2  10  -KM      Time 2  10 secs  -KM         Exercise 3    Exercise Name 3  LTR stretch bilateral  -KM      Cueing 3  Verbal;Tactile  -KM      Reps 3  10  -KM      Time 3  10 secs  -KM         Exercise 4    Exercise Name 4  Sidelying rotational stretch bilateral  -KM      Cueing 4  Verbal;Tactile  -KM      Reps 4  10  -KM      Time 4  10 secs  -KM         Exercise 5    Exercise Name 5  Prayer stretch-bilateral  -KM      Cueing 5  Verbal;Tactile  -KM      Reps 5  10  -KM      Time 5  10 secs  -KM         Exercise 6    Exercise Name 6  Levator strtch-bilateral  -KM      Cueing 6  Verbal;Demo  -KM      Reps 6  10  -KM      Time 6  10 secs  -KM         Exercise 7    Exercise Name 7  Upper Trap stretch-bilateral  -KM      Cueing 7  Verbal;Demo  -KM      Reps 7  10   -KM      Time 7  10 secs  -KM         Exercise 8    Exercise Name 8  Prone chest raise  -KM      Cueing 8  Verbal;Tactile  -KM      Reps 8  25  -KM         Exercise 9    Exercise Name 9  Trunk rotation in standing vs theraband  -KM      Cueing 9  Verbal;Demo  -KM      Reps 9  25  -KM      Time 9  gold  -KM         Exercise 10    Exercise Name 10  Prone press up  -KM      Cueing 10  Verbal;Tactile  -KM      Reps 10  20  -KM         Exercise 11    Exercise Name 11  TB Rows  -KM      Reps 11  25  -KM      Time 11  Silver  -KM         Exercise 12    Exercise Name 12  TB Ext  -KM      Reps 12  25  -KM      Time 12  Silver  -KM        User Key  (r) = Recorded By, (t) = Taken By, (c) = Cosigned By    Initials Name Provider Type    Arcelia Galvez, PTA Physical Therapy Assistant                                          Time Calculation:    Start Time: 0835  Stop Time: 0915  Time Calculation (min): 40 min  Therapy Charges for Today     Code Description Service Date Service Provider Modifiers Qty    39523709990 HC PT THER PROC EA 15 MIN 7/8/2019 Arcelia Johnson, PTA GP 1                    Arcelia Johnson, JADE  7/8/2019

## 2019-07-12 ENCOUNTER — HOSPITAL ENCOUNTER (OUTPATIENT)
Dept: PHYSICAL THERAPY | Facility: HOSPITAL | Age: 39
Setting detail: THERAPIES SERIES
Discharge: HOME | End: 2019-07-12

## 2019-07-12 DIAGNOSIS — M54.6 ACUTE MIDLINE THORACIC BACK PAIN: Primary | ICD-10-CM

## 2019-07-12 PROCEDURE — 97110 THERAPEUTIC EXERCISES: CPT

## 2019-07-12 NOTE — THERAPY TREATMENT NOTE
"    Outpatient Physical Therapy Ortho Treatment Note   [place]     Patient Name: Hiram Ho  : 1980  MRN: [id number]  Today's Date: 2019      Visit Date: 2019    Visit Dx:    ICD-10-CM ICD-9-CM   1. Acute midline thoracic back pain M54.6 724.1       Patient Active Problem List   Diagnosis   • Routine health maintenance   • Family history of GI malignancy        Past Medical History:   Diagnosis Date   • Asian flu type A    • Asthma    • Earache    • Environmental and seasonal allergies         Past Surgical History:   Procedure Laterality Date   • COLONOSCOPY N/A 2017    Procedure: COLONOSCOPY TO CECUM;  Surgeon: Stephane Soto MD;  Location: [facility] ENDOSCOPY;  Service:    • DENTAL PROCEDURE     • MANDIBLE FRACTURE SURGERY                         PT Assessment/Plan     Row Name 19 0840          PT Assessment    Assessment Comments  Pt states the modalities and stretches continues to help. Encouraged pt to keep progressing with activities including golfing. Trial of KT tape applied.   -KM        PT Plan    PT Plan Comments  Assess pt response to KT tape. Progress as tolerated.   -KM       User Key  (r) = Recorded By, (t) = Taken By, (c) = Cosigned By    Initials Name Provider Type    Arcelia Galvez PTA Physical Therapy Assistant          Modalities     Row Name 19 0840             Moist Heat    MH Applied  Yes  -KM      Location  thoracolumbar spine with MH with pt supine 90/90  -KM      Rx Minutes  15 mins  -KM      MH Prior to Rx  Yes  -KM         ELECTRICAL STIMULATION    Attended/Unattended  Unattended  -KM      Stimulation Type  IFC  -KM      Location/Electrode Placement/Other  thoracolumbar spine with MH and pt supine 90/90  -KM         Other Treatment Provided    Taping / Bracing  KT tape: 2 \"I\" strips mid thoracic  -KM        User Key  (r) = Recorded By, (t) = Taken By, (c) = Cosigned By    Initials Name Provider Type    Arcelia Galvez PTA Physical Therapy " Assistant        Exercises     Row Name 07/12/19 0840             Subjective Comments    Subjective Comments  Pt states he notices most of his symptoms mid-thoracic including tightness and minimal soreness.   -KM         Exercise 1    Exercise Name 1  Hamstring stretch bilateral  -KM      Cueing 1  Verbal;Tactile  -KM      Reps 1  10  -KM      Time 1  10 secs  -KM         Exercise 2    Exercise Name 2  SKTC stretch bilateral  -KM      Cueing 2  Verbal;Tactile  -KM      Reps 2  10  -KM      Time 2  10 secs  -KM         Exercise 3    Exercise Name 3  LTR stretch bilateral  -KM      Cueing 3  Verbal;Tactile  -KM      Reps 3  10  -KM      Time 3  10 secs  -KM         Exercise 4    Exercise Name 4  Sidelying rotational stretch bilateral  -KM      Cueing 4  Verbal;Tactile  -KM      Reps 4  10  -KM      Time 4  10 secs  -KM         Exercise 5    Exercise Name 5  Prayer stretch-bilateral  -KM      Cueing 5  Verbal;Tactile  -KM      Reps 5  10  -KM      Time 5  10 secs  -KM         Exercise 6    Exercise Name 6  Levator strtch-bilateral  -KM      Cueing 6  Verbal;Demo  -KM      Reps 6  10  -KM      Time 6  10 secs  -KM         Exercise 7    Exercise Name 7  Upper Trap stretch-bilateral  -KM      Cueing 7  Verbal;Demo  -KM      Reps 7  10   -KM      Time 7  10 secs  -KM         Exercise 8    Exercise Name 8  Prone chest raise  -KM      Cueing 8  Verbal;Tactile  -KM      Reps 8  25  -KM         Exercise 9    Exercise Name 9  Trunk rotation in standing vs theraband  -KM      Cueing 9  Verbal;Demo  -KM      Reps 9  25  -KM      Time 9  gold  -KM         Exercise 10    Exercise Name 10  Prone press up  -KM      Cueing 10  Verbal;Tactile  -KM      Reps 10  20  -KM         Exercise 11    Exercise Name 11  TB Rows  -KM      Reps 11  25  -KM      Time 11  Silver  -KM         Exercise 12    Exercise Name 12  TB Ext  -KM      Reps 12  25  -KM      Time 12  Silver  -KM        User Key  (r) = Recorded By, (t) = Taken By, (c) = Cosigned  By    Initials Name Provider Type    Arcelia Galvez PTA Physical Therapy Assistant                                          Time Calculation:   Start Time: 0840  Stop Time: 0930  Time Calculation (min): 50 min  Therapy Charges for Today     Code Description Service Date Service Provider Modifiers Qty    01470708374 HC PT THER PROC EA 15 MIN 7/12/2019 Arcelia Johnson PTA GP 1                    Arcelia Johnson PTA  7/12/2019

## 2019-07-15 ENCOUNTER — HOSPITAL ENCOUNTER (OUTPATIENT)
Dept: PHYSICAL THERAPY | Facility: HOSPITAL | Age: 39
Setting detail: THERAPIES SERIES
Discharge: HOME | End: 2019-07-15

## 2019-07-15 PROCEDURE — 97110 THERAPEUTIC EXERCISES: CPT

## 2019-07-15 NOTE — THERAPY TREATMENT NOTE
"    Outpatient Physical Therapy Ortho Treatment Note   [place]     Patient Name: Hiram Ho  : 1980  MRN: [id number]  Today's Date: 7/15/2019      Visit Date: 07/15/2019    Visit Dx:  No diagnosis found.    Patient Active Problem List   Diagnosis   • Routine health maintenance   • Family history of GI malignancy        Past Medical History:   Diagnosis Date   • Asian flu type A    • Asthma    • Earache    • Environmental and seasonal allergies         Past Surgical History:   Procedure Laterality Date   • COLONOSCOPY N/A 2017    Procedure: COLONOSCOPY TO CECUM;  Surgeon: Stephane Soto MD;  Location: [facility] ENDOSCOPY;  Service:    • DENTAL PROCEDURE     • MANDIBLE FRACTURE SURGERY                         PT Assessment/Plan     Row Name 07/15/19 1030          PT Assessment    Assessment Comments  Current treatment plan completed and tolerated well. Re-applied KT tape to lower mid-thoracic region.   -KM        PT Plan    PT Plan Comments  Cotinue per POC. Incorporate prone Y/Ts  -KM       User Key  (r) = Recorded By, (t) = Taken By, (c) = Cosigned By    Initials Name Provider Type    Arcelia Galvez PTA Physical Therapy Assistant          Modalities     Row Name 07/15/19 1030             Moist Heat    MH Applied  Yes  -KM      Location  thoracolumbar spine with MH with pt supine 90/90  -KM      Rx Minutes  15 mins  -KM      MH Prior to Rx  Yes  -KM         ELECTRICAL STIMULATION    Attended/Unattended  Unattended  -KM      Stimulation Type  IFC  -KM      Location/Electrode Placement/Other  thoracolumbar spine with MH and pt supine 90/90  -KM         Other Treatment Provided    Taping / Bracing  KT tape: 2 \"I\" strips mid thoracic  -KM        User Key  (r) = Recorded By, (t) = Taken By, (c) = Cosigned By    Initials Name Provider Type    Arcelia Galvez PTA Physical Therapy Assistant        Exercises     Row Name 07/15/19 1030             Subjective Comments    Subjective Comments  Pt states " he had to take the KT off the same day, he states he felt an increase pull on his neck with where the tape was located. He states he continues to improve.   -KM         Exercise 1    Exercise Name 1  Hamstring stretch bilateral  -KM      Cueing 1  Verbal;Tactile  -KM      Reps 1  10  -KM      Time 1  10 secs  -KM         Exercise 2    Exercise Name 2  SKTC stretch bilateral  -KM      Cueing 2  Verbal;Tactile  -KM      Reps 2  10  -KM      Time 2  10 secs  -KM         Exercise 3    Exercise Name 3  LTR stretch bilateral  -KM      Cueing 3  Verbal;Tactile  -KM      Reps 3  10  -KM      Time 3  10 secs  -KM         Exercise 4    Exercise Name 4  Sidelying rotational stretch bilateral  -KM      Cueing 4  Verbal;Tactile  -KM      Reps 4  10  -KM      Time 4  10 secs  -KM         Exercise 5    Exercise Name 5  Prayer stretch-bilateral  -KM      Cueing 5  Verbal;Tactile  -KM      Reps 5  10  -KM      Time 5  10 secs  -KM         Exercise 6    Exercise Name 6  Levator strtch-bilateral  -KM      Cueing 6  Verbal;Demo  -KM      Reps 6  10  -KM      Time 6  10 secs  -KM         Exercise 7    Exercise Name 7  Upper Trap stretch-bilateral  -KM      Cueing 7  Verbal;Demo  -KM      Reps 7  10   -KM      Time 7  10 secs  -KM         Exercise 8    Exercise Name 8  Prone chest raise  -KM      Cueing 8  Verbal;Tactile  -KM      Reps 8  25  -KM         Exercise 9    Exercise Name 9  Trunk rotation in standing vs theraband  -KM      Cueing 9  Verbal;Demo  -KM      Reps 9  25  -KM      Time 9  gold  -KM         Exercise 10    Exercise Name 10  Prone press up  -KM      Cueing 10  Verbal;Tactile  -KM      Reps 10  20  -KM         Exercise 11    Exercise Name 11  TB Rows  -KM      Reps 11  25  -KM      Time 11  Silver  -KM         Exercise 12    Exercise Name 12  TB Ext  -KM      Reps 12  25  -KM      Time 12  Silver  -KM        User Key  (r) = Recorded By, (t) = Taken By, (c) = Cosigned By    Initials Name Provider Type    SYED Johnson  JADE [place] Physical Therapy Assistant                                          Time Calculation:   Start Time: 1030  Stop Time: 1115  Time Calculation (min): 45 min  Therapy Charges for Today     Code Description Service Date Service Provider Modifiers Qty    22201025199  PT THER PROC EA 15 MIN 7/15/2019 Arcelia Johnson PTA GP 1                    Arcelia Johnson PTA  7/15/2019

## 2019-07-18 ENCOUNTER — HOSPITAL ENCOUNTER (OUTPATIENT)
Dept: PHYSICAL THERAPY | Facility: HOSPITAL | Age: 39
Setting detail: THERAPIES SERIES
Discharge: HOME | End: 2019-07-18

## 2019-07-18 DIAGNOSIS — M54.6 ACUTE MIDLINE THORACIC BACK PAIN: Primary | ICD-10-CM

## 2019-07-18 PROCEDURE — 97110 THERAPEUTIC EXERCISES: CPT

## 2019-07-18 NOTE — THERAPY TREATMENT NOTE
Outpatient Physical Therapy Ortho Treatment Note   [place]     Patient Name: Hiram Ho  : 1980  MRN: [id number]  Today's Date: 2019      Visit Date: 2019    Visit Dx:    ICD-10-CM ICD-9-CM   1. Acute midline thoracic back pain M54.6 724.1       Patient Active Problem List   Diagnosis   • Routine health maintenance   • Family history of GI malignancy        Past Medical History:   Diagnosis Date   • Asian flu type A    • Asthma    • Earache    • Environmental and seasonal allergies         Past Surgical History:   Procedure Laterality Date   • COLONOSCOPY N/A 2017    Procedure: COLONOSCOPY TO CECUM;  Surgeon: Stephane Soto MD;  Location: [facility] ENDOSCOPY;  Service:    • DENTAL PROCEDURE     • MANDIBLE FRACTURE SURGERY                         PT Assessment/Plan     Row Name 19 0835          PT Assessment    Assessment Comments  Pt tolerated progression of strengthening well with increased TB resistance along with the addition of Prone Y/T. Pt states he felt positive support with the KT but felt some pull UT day two.   -KM        PT Plan    PT Plan Comments  Pt to continue with HEP. KT was not re-applied  -KM       User Key  (r) = Recorded By, (t) = Taken By, (c) = Cosigned By    Initials Name Provider Type     Arcelia Johnson PTA Physical Therapy Assistant          Modalities     Row Name 19 0835             Subjective Comments    Subjective Comments  Pt states he continues to feel good, had a positive response with the KT tape mid-thoracic. He states he can tell an improvement with the strengthening exercises  -KM         Moist Heat    MH Applied  Yes  -KM      Location  thoracolumbar spine with MH with pt supine 90/90  -KM      Rx Minutes  15 mins  -KM      MH Prior to Rx  Yes  -KM         ELECTRICAL STIMULATION    Attended/Unattended  Unattended  -KM      Stimulation Type  IFC  -KM      Location/Electrode Placement/Other  thoracolumbar spine with MH and pt supine  90/90  -KM         Other Treatment Provided    Taping / Bracing  --  -KM        User Key  (r) = Recorded By, (t) = Taken By, (c) = Cosigned By    Initials Name Provider Type    Arcelia Galvez PTA Physical Therapy Assistant        Exercises     Row Name 07/18/19 0835             Subjective Comments    Subjective Comments  Pt states he continues to feel good, had a positive response with the KT tape mid-thoracic. He states he can tell an improvement with the strengthening exercises  -KM         Exercise 1    Exercise Name 1  Hamstring stretch bilateral  -KM      Cueing 1  Verbal;Tactile  -KM      Reps 1  10  -KM      Time 1  10 secs  -KM         Exercise 2    Exercise Name 2  SKTC stretch bilateral  -KM      Cueing 2  Verbal;Tactile  -KM      Reps 2  10  -KM      Time 2  10 secs  -KM         Exercise 3    Exercise Name 3  LTR stretch bilateral  -KM      Cueing 3  Verbal;Tactile  -KM      Reps 3  10  -KM      Time 3  10 secs  -KM         Exercise 4    Exercise Name 4  Sidelying rotational stretch bilateral  -KM      Cueing 4  Verbal;Tactile  -KM      Reps 4  10  -KM      Time 4  10 secs  -KM         Exercise 5    Exercise Name 5  Prayer stretch-bilateral  -KM      Cueing 5  Verbal;Tactile  -KM      Reps 5  10  -KM      Time 5  10 secs  -KM         Exercise 6    Exercise Name 6  Levator strtch-bilateral  -KM      Cueing 6  Verbal;Demo  -KM      Reps 6  10  -KM      Time 6  10 secs  -KM         Exercise 7    Exercise Name 7  Upper Trap stretch-bilateral  -KM      Cueing 7  Verbal;Demo  -KM      Reps 7  10   -KM      Time 7  10 secs  -KM         Exercise 8    Exercise Name 8  Prone chest raise  -KM      Cueing 8  Verbal;Tactile  -KM      Reps 8  25  -KM         Exercise 9    Exercise Name 9  Trunk rotation in standing vs theraband  -KM      Cueing 9  Verbal;Demo  -KM      Reps 9  25  -KM      Time 9  gold  -KM         Exercise 10    Exercise Name 10  Prone press up  -KM      Cueing 10  Verbal;Tactile  -KM      Reps 10   20  -KM         Exercise 11    Exercise Name 11  TB Rows  -KM      Reps 11  25  -KM      Time 11  Gold  -KM         Exercise 12    Exercise Name 12  TB Ext  -KM      Reps 12  25  -KM      Time 12  Gold  -KM         Exercise 13    Exercise Name 13  B. Prone Y/T  -KM      Reps 13  25x each  -KM      Time 13  2#  -KM        User Key  (r) = Recorded By, (t) = Taken By, (c) = Cosigned By    Initials Name Provider Type    Arcelia Galvez PTA Physical Therapy Assistant                                          Time Calculation:   Start Time: 0835  Stop Time: 0930  Time Calculation (min): 55 min  Therapy Charges for Today     Code Description Service Date Service Provider Modifiers Qty    63254375945 HC PT THER PROC EA 15 MIN 7/18/2019 Arcelia Johnson PTA GP 2                    Arcelia Johnson PTA  7/18/2019

## 2019-07-24 ENCOUNTER — HOSPITAL ENCOUNTER (OUTPATIENT)
Dept: PHYSICAL THERAPY | Facility: HOSPITAL | Age: 39
Setting detail: THERAPIES SERIES
Discharge: HOME | End: 2019-07-24

## 2019-07-24 DIAGNOSIS — M54.6 ACUTE MIDLINE THORACIC BACK PAIN: Primary | ICD-10-CM

## 2019-07-24 NOTE — THERAPY TREATMENT NOTE
Outpatient Physical Therapy Ortho Treatment Note   [place]     Patient Name: Hiram Ho  : 1980  MRN: [id number]  Today's Date: 2019      Visit Date: 2019    Visit Dx:    ICD-10-CM ICD-9-CM   1. Acute midline thoracic back pain M54.6 724.1       Patient Active Problem List   Diagnosis   • Routine health maintenance   • Family history of GI malignancy        Past Medical History:   Diagnosis Date   • Asian flu type A    • Asthma    • Earache    • Environmental and seasonal allergies         Past Surgical History:   Procedure Laterality Date   • COLONOSCOPY N/A 2017    Procedure: COLONOSCOPY TO CECUM;  Surgeon: Stephane Soto MD;  Location: [facility] ENDOSCOPY;  Service:    • DENTAL PROCEDURE     • MANDIBLE FRACTURE SURGERY                         PT Assessment/Plan     Row Name 1930          PT Assessment    Assessment Comments  Pt reports the exercises continue to be challenging  and demonstrates improved tolerance.   -KM        PT Plan    PT Plan Comments  Progress as tolerated. Possible trial of KT tape lower mid-thoracic  -KM       User Key  (r) = Recorded By, (t) = Taken By, (c) = Cosigned By    Initials Name Provider Type    Arcelia Galvez PTA Physical Therapy Assistant          Modalities     Row Name 19 7853             Subjective Comments    Subjective Comments  Pt states he has noticed improved strength and decreased soreness with the progression of exercises.  -KM         Moist Heat    MH Applied  Yes  -KM      Location  thoracolumbar spine with MH with pt supine 90/90  -KM      Rx Minutes  15 mins  -KM      MH Prior to Rx  Yes  -KM         ELECTRICAL STIMULATION    Attended/Unattended  Unattended  -KM      Stimulation Type  IFC  -KM      Location/Electrode Placement/Other  thoracolumbar spine with MH and pt supine 90/90  -KM        User Key  (r) = Recorded By, (t) = Taken By, (c) = Cosigned By    Initials Name Provider Type    Arcelia Galvez PTA  Physical Therapy Assistant        Exercises     Row Name 07/24/19 0730             Subjective Comments    Subjective Comments  Pt states he has noticed improved strength and decreased soreness with the progression of exercises.  -KM         Exercise 1    Exercise Name 1  Hamstring stretch bilateral  -KM      Cueing 1  Verbal;Tactile  -KM      Reps 1  10  -KM      Time 1  10 secs  -KM         Exercise 2    Exercise Name 2  SKTC stretch bilateral  -KM      Cueing 2  Verbal;Tactile  -KM      Reps 2  10  -KM      Time 2  10 secs  -KM         Exercise 3    Exercise Name 3  LTR stretch bilateral  -KM      Cueing 3  Verbal;Tactile  -KM      Reps 3  10  -KM      Time 3  10 secs  -KM         Exercise 4    Exercise Name 4  Sidelying rotational stretch bilateral  -KM      Cueing 4  Verbal;Tactile  -KM      Reps 4  10  -KM      Time 4  10 secs  -KM         Exercise 5    Exercise Name 5  Prayer stretch-bilateral  -KM      Cueing 5  Verbal;Tactile  -KM      Reps 5  10  -KM      Time 5  10 secs  -KM         Exercise 6    Exercise Name 6  Levator strtch-bilateral  -KM      Cueing 6  Verbal;Demo  -KM      Reps 6  10  -KM      Time 6  10 secs  -KM         Exercise 7    Exercise Name 7  Upper Trap stretch-bilateral  -KM      Cueing 7  Verbal;Demo  -KM      Reps 7  10   -KM      Time 7  10 secs  -KM         Exercise 8    Exercise Name 8  Prone chest raise  -KM      Cueing 8  Verbal;Tactile  -KM      Reps 8  25  -KM         Exercise 9    Exercise Name 9  Trunk rotation in standing vs theraband  -KM      Cueing 9  Verbal;Demo  -KM      Reps 9  25  -KM      Time 9  gold  -KM         Exercise 10    Exercise Name 10  Prone press up  -KM      Cueing 10  Verbal;Tactile  -KM      Reps 10  20  -KM         Exercise 11    Exercise Name 11  TB Rows  -KM      Reps 11  25  -KM      Time 11  Gold  -KM         Exercise 12    Exercise Name 12  TB Ext  -KM      Reps 12  25  -KM      Time 12  Gold  -KM         Exercise 13    Exercise Name 13  B. Prone  Y/T  -KM      Reps 13  25x each  -KM      Time 13  2#  -KM        User Key  (r) = Recorded By, (t) = Taken By, (c) = Cosigned By    Initials Name Provider Type    Arcelia Galvez PTA Physical Therapy Assistant                                          Time Calculation:   Start Time: 0730  Stop Time: 0820  Time Calculation (min): 50 min                Arcelia Johnson PTA  7/24/2019

## 2019-07-26 ENCOUNTER — HOSPITAL ENCOUNTER (OUTPATIENT)
Dept: PHYSICAL THERAPY | Facility: HOSPITAL | Age: 39
Setting detail: THERAPIES SERIES
Discharge: HOME | End: 2019-07-26

## 2019-07-26 DIAGNOSIS — M54.6 ACUTE MIDLINE THORACIC BACK PAIN: Primary | ICD-10-CM

## 2019-07-26 PROCEDURE — 97110 THERAPEUTIC EXERCISES: CPT

## 2019-07-29 ENCOUNTER — HOSPITAL ENCOUNTER (OUTPATIENT)
Dept: PHYSICAL THERAPY | Facility: HOSPITAL | Age: 39
Setting detail: THERAPIES SERIES
Discharge: HOME | End: 2019-07-29

## 2019-07-29 DIAGNOSIS — M54.6 ACUTE MIDLINE THORACIC BACK PAIN: Primary | ICD-10-CM

## 2019-07-29 PROCEDURE — 97110 THERAPEUTIC EXERCISES: CPT

## 2019-07-29 NOTE — THERAPY TREATMENT NOTE
Outpatient Physical Therapy Ortho Treatment Note   [place]     Patient Name: Hiram Ho  : 1980  MRN: [id number]  Today's Date: 2019      Visit Date: 2019    Visit Dx:    ICD-10-CM ICD-9-CM   1. Acute midline thoracic back pain M54.6 724.1       Patient Active Problem List   Diagnosis   • Routine health maintenance   • Family history of GI malignancy        Past Medical History:   Diagnosis Date   • Asian flu type A    • Asthma    • Earache    • Environmental and seasonal allergies         Past Surgical History:   Procedure Laterality Date   • COLONOSCOPY N/A 2017    Procedure: COLONOSCOPY TO CECUM;  Surgeon: Stephane Soto MD;  Location: [facility] ENDOSCOPY;  Service:    • DENTAL PROCEDURE     • MANDIBLE FRACTURE SURGERY                         PT Assessment/Plan     Row Name 19 1140          PT Assessment    Assessment Comments  Pt demonstrates improved tolerance with prescribed exercises and states he feels better at completion of session.   -KM        PT Plan    PT Plan Comments  Progress as tolerated  -KM       User Key  (r) = Recorded By, (t) = Taken By, (c) = Cosigned By    Initials Name Provider Type    Arcelia Galvez PTA Physical Therapy Assistant          Modalities     Row Name 19 1140             Moist Heat    MH Applied  Yes  -KM      Location  thoracolumbar spine with MH with pt supine 90/90  -KM      Rx Minutes  15 mins  -KM      MH Prior to Rx  Yes  -KM         ELECTRICAL STIMULATION    Attended/Unattended  Unattended  -KM      Stimulation Type  IFC  -KM      Location/Electrode Placement/Other  thoracolumbar spine with MH and pt supine 90/90  -KM        User Key  (r) = Recorded By, (t) = Taken By, (c) = Cosigned By    Initials Name Provider Type    Arcelia Galvez PTA Physical Therapy Assistant        Exercises     Row Name 19 7027             Subjective Comments    Subjective Comments  Pt states he played 9 holes of golf over the weekend and  experienced soreness afterwards. He states he notices increased strength with exercises.   -KM         Exercise 1    Exercise Name 1  Hamstring stretch bilateral  -KM      Cueing 1  Verbal;Tactile  -KM      Reps 1  10  -KM      Time 1  10 secs  -KM         Exercise 2    Exercise Name 2  SKTC stretch bilateral  -KM      Cueing 2  Verbal;Tactile  -KM      Reps 2  10  -KM      Time 2  10 secs  -KM         Exercise 3    Exercise Name 3  LTR stretch bilateral  -KM      Cueing 3  Verbal;Tactile  -KM      Reps 3  10  -KM      Time 3  10 secs  -KM         Exercise 4    Exercise Name 4  Sidelying rotational stretch bilateral  -KM      Cueing 4  Verbal;Tactile  -KM      Reps 4  10  -KM      Time 4  10 secs  -KM         Exercise 5    Exercise Name 5  Prayer stretch-bilateral  -KM      Cueing 5  Verbal;Tactile  -KM      Reps 5  10  -KM      Time 5  10 secs  -KM         Exercise 6    Exercise Name 6  Levator strtch-bilateral  -KM      Cueing 6  Verbal;Demo  -KM      Reps 6  10  -KM      Time 6  10 secs  -KM         Exercise 7    Exercise Name 7  Upper Trap stretch-bilateral  -KM      Cueing 7  Verbal;Demo  -KM      Reps 7  10   -KM      Time 7  10 secs  -KM         Exercise 8    Exercise Name 8  Prone chest raise  -KM      Cueing 8  Verbal;Tactile  -KM      Reps 8  25  -KM         Exercise 9    Exercise Name 9  Trunk rotation in standing vs theraband  -KM      Cueing 9  Verbal;Demo  -KM      Reps 9  25  -KM      Time 9  gold  -KM         Exercise 10    Exercise Name 10  Prone press up  -KM      Cueing 10  Verbal;Tactile  -KM      Reps 10  20  -KM         Exercise 11    Exercise Name 11  TB Rows  -KM      Reps 11  25  -KM      Time 11  Gold  -KM         Exercise 12    Exercise Name 12  TB Ext  -KM      Reps 12  25  -KM      Time 12  Gold  -KM         Exercise 13    Exercise Name 13  B. Prone Y/T  -KM      Reps 13  25x each  -KM      Time 13  3#  -KM        User Key  (r) = Recorded By, (t) = Taken By, (c) = Cosigned By    Initials  Name Provider Type    Arcelia Galvez PTA Physical Therapy Assistant                                          Time Calculation:   Start Time: 1140  Stop Time: 1230  Time Calculation (min): 50 min  Therapy Charges for Today     Code Description Service Date Service Provider Modifiers Qty    49543003332 HC PT THER PROC EA 15 MIN 7/29/2019 Arcelia Johnson PTA GP 1                    Arcelia Johnson PTA  7/29/2019

## 2019-07-31 ENCOUNTER — HOSPITAL ENCOUNTER (OUTPATIENT)
Dept: PHYSICAL THERAPY | Facility: HOSPITAL | Age: 39
Setting detail: THERAPIES SERIES
Discharge: HOME | End: 2019-07-31

## 2019-07-31 DIAGNOSIS — M54.6 ACUTE MIDLINE THORACIC BACK PAIN: Primary | ICD-10-CM

## 2019-07-31 PROCEDURE — 97110 THERAPEUTIC EXERCISES: CPT

## 2019-08-06 ENCOUNTER — APPOINTMENT (OUTPATIENT)
Dept: PHYSICAL THERAPY | Facility: HOSPITAL | Age: 39
End: 2019-08-06

## 2019-08-07 ENCOUNTER — HOSPITAL ENCOUNTER (OUTPATIENT)
Dept: PHYSICAL THERAPY | Facility: HOSPITAL | Age: 39
Setting detail: THERAPIES SERIES
Discharge: HOME | End: 2019-08-07

## 2019-08-07 DIAGNOSIS — M54.6 ACUTE MIDLINE THORACIC BACK PAIN: Primary | ICD-10-CM

## 2019-08-07 PROCEDURE — 97110 THERAPEUTIC EXERCISES: CPT

## 2019-08-07 NOTE — THERAPY TREATMENT NOTE
Outpatient Physical Therapy Ortho Treatment Note   [facility]     Patient Name: Hiram Ho  : 1980  MRN: [id number]  Today's Date: 2019      Visit Date: 2019    Visit Dx:    ICD-10-CM ICD-9-CM   1. Acute midline thoracic back pain M54.6 724.1       Patient Active Problem List   Diagnosis   • Routine health maintenance   • Family history of GI malignancy        Past Medical History:   Diagnosis Date   • Asian flu type A    • Asthma    • Earache    • Environmental and seasonal allergies         Past Surgical History:   Procedure Laterality Date   • COLONOSCOPY N/A 2017    Procedure: COLONOSCOPY TO CECUM;  Surgeon: Stephane Soto MD;  Location: [facility] ENDOSCOPY;  Service:    • DENTAL PROCEDURE     • MANDIBLE FRACTURE SURGERY                         PT Assessment/Plan     Row Name 19 1110          PT Assessment    Assessment Comments  Pt completed current treatment plan well and states he felt good at completion of session  -KM        PT Plan    PT Plan Comments  Continue per POC  -KM       User Key  (r) = Recorded By, (t) = Taken By, (c) = Cosigned By    Initials Name Provider Type    Arcelia Galvez PTA Physical Therapy Assistant          Modalities     Row Name 19 1110             Moist Heat    MH Applied  Yes  -KM      Location  thoracolumbar spine with MH with pt supine 90/90  -KM      Rx Minutes  15 mins  -KM      MH Prior to Rx  Yes  -KM         ELECTRICAL STIMULATION    Attended/Unattended  Unattended  -KM      Stimulation Type  IFC  -KM      Location/Electrode Placement/Other  thoracolumbar spine with MH and pt supine 90/90  -KM        User Key  (r) = Recorded By, (t) = Taken By, (c) = Cosigned By    Initials Name Provider Type    Arcelia Galvez PTA Physical Therapy Assistant        Exercises     Row Name 19 1110             Subjective Comments    Subjective Comments  Pt states his back feels good, states he was experiencing some tightness in his neck    -KM         Exercise 1    Exercise Name 1  Hamstring stretch bilateral  -KM      Cueing 1  Verbal;Tactile  -KM      Reps 1  10  -KM      Time 1  10 secs  -KM         Exercise 2    Exercise Name 2  SKTC stretch bilateral  -KM      Cueing 2  Verbal;Tactile  -KM      Reps 2  10  -KM      Time 2  10 secs  -KM         Exercise 3    Exercise Name 3  LTR stretch bilateral  -KM      Cueing 3  Verbal;Tactile  -KM      Reps 3  10  -KM      Time 3  10 secs  -KM         Exercise 4    Exercise Name 4  Sidelying rotational stretch bilateral  -KM      Cueing 4  Verbal;Tactile  -KM      Reps 4  10  -KM      Time 4  10 secs  -KM         Exercise 5    Exercise Name 5  Prayer stretch-bilateral  -KM      Cueing 5  Verbal;Tactile  -KM      Reps 5  10  -KM      Time 5  10 secs  -KM         Exercise 6    Exercise Name 6  Levator strtch-bilateral  -KM      Cueing 6  Verbal;Demo  -KM      Reps 6  10  -KM      Time 6  10 secs  -KM         Exercise 7    Exercise Name 7  Upper Trap stretch-bilateral  -KM      Cueing 7  Verbal;Demo  -KM      Reps 7  10   -KM      Time 7  10 secs  -KM         Exercise 8    Exercise Name 8  Prone chest raise  -KM      Cueing 8  Verbal;Tactile  -KM      Reps 8  25  -KM         Exercise 9    Exercise Name 9  Trunk rotation in standing vs theraband  -KM      Cueing 9  Verbal;Demo  -KM      Reps 9  25  -KM      Time 9  gold  -KM         Exercise 10    Exercise Name 10  Prone press up  -KM      Cueing 10  Verbal;Tactile  -KM      Reps 10  20  -KM         Exercise 11    Exercise Name 11  TB Rows  -KM      Reps 11  25  -KM      Time 11  Gold  -KM         Exercise 12    Exercise Name 12  TB Ext  -KM      Reps 12  25  -KM      Time 12  Gold  -KM         Exercise 13    Exercise Name 13  B. Prone Y/T  -KM      Reps 13  25x each  -KM      Time 13  2#  -KM        User Key  (r) = Recorded By, (t) = Taken By, (c) = Cosigned By    Initials Name Provider Type    Arcelia Galvez, PTA Physical Therapy Assistant                                           Time Calculation:   Start Time: 1110  Stop Time: 1155  Time Calculation (min): 45 min  Therapy Charges for Today     Code Description Service Date Service Provider Modifiers Qty    47808557919 HC PT THER PROC EA 15 MIN 8/7/2019 Arcelia Johnson, PTA GP 1                    Arcelia Johnson, JADE  8/7/2019

## 2019-08-09 ENCOUNTER — HOSPITAL ENCOUNTER (OUTPATIENT)
Dept: PHYSICAL THERAPY | Facility: HOSPITAL | Age: 39
Setting detail: THERAPIES SERIES
Discharge: HOME | End: 2019-08-09

## 2019-08-09 DIAGNOSIS — M54.6 ACUTE MIDLINE THORACIC BACK PAIN: Primary | ICD-10-CM

## 2019-08-09 PROCEDURE — 97110 THERAPEUTIC EXERCISES: CPT

## 2019-08-09 NOTE — THERAPY TREATMENT NOTE
Outpatient Physical Therapy Ortho Treatment Note   [place]     Patient Name: Hiram Ho  : 1980  MRN: [id number]  Today's Date: 2019      Visit Date: 2019    Visit Dx:    ICD-10-CM ICD-9-CM   1. Acute midline thoracic back pain M54.6 724.1       Patient Active Problem List   Diagnosis   • Routine health maintenance   • Family history of GI malignancy        Past Medical History:   Diagnosis Date   • Asian flu type A    • Asthma    • Earache    • Environmental and seasonal allergies         Past Surgical History:   Procedure Laterality Date   • COLONOSCOPY N/A 2017    Procedure: COLONOSCOPY TO CECUM;  Surgeon: Stephane Soto MD;  Location: [facility] ENDOSCOPY;  Service:    • DENTAL PROCEDURE     • MANDIBLE FRACTURE SURGERY                         PT Assessment/Plan     Row Name 19 1120          PT Assessment    Assessment Comments  Pt demonstrates improved tolerance with ther ex and was able to complete increased weight with Prone Y/T. Plan to see pt 1x/week.   -KM        PT Plan    PT Plan Comments  Continue to progress as tolerated.   -KM       User Key  (r) = Recorded By, (t) = Taken By, (c) = Cosigned By    Initials Name Provider Type    Arcelia Galvez PTA Physical Therapy Assistant          Modalities     Row Name 19 1120             Subjective Comments    Subjective Comments  Pt states he continues to do well with minimal soreness with increased activity.   -KM         Moist Heat    MH Applied  Yes  -KM      Location  thoracolumbar spine with MH with pt supine 90/90  -KM      Rx Minutes  15 mins  -KM      MH Prior to Rx  Yes  -KM         ELECTRICAL STIMULATION    Attended/Unattended  Unattended  -KM      Stimulation Type  IFC  -KM      Location/Electrode Placement/Other  thoracolumbar spine with MH and pt supine 90/90  -KM        User Key  (r) = Recorded By, (t) = Taken By, (c) = Cosigned By    Initials Name Provider Type    Arcelia Galvez PTA Physical Therapy  Assistant        Exercises     Row Name 08/09/19 1120             Subjective Comments    Subjective Comments  Pt states he continues to do well with minimal soreness with increased activity.   -KM         Exercise 1    Exercise Name 1  Hamstring stretch bilateral  -KM      Cueing 1  Verbal;Tactile  -KM      Reps 1  10  -KM      Time 1  10 secs  -KM         Exercise 2    Exercise Name 2  SKTC stretch bilateral  -KM      Cueing 2  Verbal;Tactile  -KM      Reps 2  10  -KM      Time 2  10 secs  -KM         Exercise 3    Exercise Name 3  LTR stretch bilateral  -KM      Cueing 3  Verbal;Tactile  -KM      Reps 3  10  -KM      Time 3  10 secs  -KM         Exercise 4    Exercise Name 4  Sidelying rotational stretch bilateral  -KM      Cueing 4  Verbal;Tactile  -KM      Reps 4  10  -KM      Time 4  10 secs  -KM         Exercise 5    Exercise Name 5  Prayer stretch-bilateral  -KM      Cueing 5  Verbal;Tactile  -KM      Reps 5  10  -KM      Time 5  10 secs  -KM         Exercise 6    Exercise Name 6  Levator strtch-bilateral  -KM      Cueing 6  Verbal;Demo  -KM      Reps 6  10  -KM      Time 6  10 secs  -KM         Exercise 7    Exercise Name 7  Upper Trap stretch-bilateral  -KM      Cueing 7  Verbal;Demo  -KM      Reps 7  10   -KM      Time 7  10 secs  -KM         Exercise 8    Exercise Name 8  Prone chest raise  -KM      Cueing 8  Verbal;Tactile  -KM      Reps 8  25  -KM         Exercise 9    Exercise Name 9  Trunk rotation in standing vs theraband  -KM      Cueing 9  Verbal;Demo  -KM      Reps 9  25  -KM      Time 9  gold  -KM         Exercise 10    Exercise Name 10  Prone press up  -KM      Cueing 10  Verbal;Tactile  -KM      Reps 10  20  -KM         Exercise 11    Exercise Name 11  TB Rows  -KM      Reps 11  25  -KM      Time 11  Gold  -KM         Exercise 12    Exercise Name 12  TB Ext  -KM      Reps 12  25  -KM      Time 12  Gold  -KM         Exercise 13    Exercise Name 13  B. Prone Y/T  -KM      Reps 13  25x each  -KM       Time 13  3#  -KM        User Key  (r) = Recorded By, (t) = Taken By, (c) = Cosigned By    Initials Name Provider Type    Arcelia Galvez PTA Physical Therapy Assistant                                          Time Calculation:   Start Time: 1120  Stop Time: 1200  Time Calculation (min): 40 min  Therapy Charges for Today     Code Description Service Date Service Provider Modifiers Qty    80468082939 HC PT THER PROC EA 15 MIN 8/9/2019 Arcelia Jonhson PTA GP 1                    Arcelia Johnson PTA  8/9/2019

## 2019-08-12 ENCOUNTER — OFFICE VISIT (OUTPATIENT)
Dept: CARDIOLOGY | Facility: CLINIC | Age: 39
End: 2019-08-12

## 2019-08-12 VITALS
BODY MASS INDEX: 28.55 KG/M2 | HEIGHT: 74 IN | WEIGHT: 222.5 LBS | SYSTOLIC BLOOD PRESSURE: 130 MMHG | HEART RATE: 59 BPM | DIASTOLIC BLOOD PRESSURE: 88 MMHG

## 2019-08-12 DIAGNOSIS — R07.89 CHEST TIGHTNESS: Primary | ICD-10-CM

## 2019-08-12 PROCEDURE — 99204 OFFICE O/P NEW MOD 45 MIN: CPT | Performed by: INTERNAL MEDICINE

## 2019-08-12 PROCEDURE — 93000 ELECTROCARDIOGRAM COMPLETE: CPT | Performed by: INTERNAL MEDICINE

## 2019-08-12 NOTE — PROGRESS NOTES
Subjective:     Encounter Date:08/12/2019      Patient ID: Hiram Ho is a 39 y.o. male.    Chief Complaint:  Chest Pain    This is a new problem. The current episode started more than 1 month ago. The problem occurs intermittently. The quality of the pain is described as tightness. Associated symptoms include shortness of breath.     39-year-old gentleman who presents today for evaluation of some chest tightness.  Patient says that about 6 weeks ago he was working in the yard.  He has a lot of allergy to grasses that he is working out for a prolonged period time.  He said he got very short of breath was wheezing some and got some tightness in his chest.  He was seen ultimately placed on a steroid pack as well as inhalers and it subsequently did improve.  Patient was then referred to Dr. Oconnor allergy who is concerned that he had some tightness in his chest and wanted him to be evaluated from a cardiovascular standpoint.  Patient works on a treadmill about 3 times a week this week and he walked 18 holes and did not have any types of problems.  He however was very concerned when Dr. Oconnor said that he wanted his heart checked out.  Patient said with that he just had his mind race quite a bit he does not want to die of a heart attack.    Review of Systems   Constitution: Positive for weight gain.   Cardiovascular: Positive for chest pain.   Respiratory: Positive for shortness of breath and wheezing.    Psychiatric/Behavioral: The patient is nervous/anxious.    All other systems reviewed and are negative.        ECG 12 Lead  Date/Time: 8/12/2019 12:27 PM  Performed by: Lalito Olivo MD  Authorized by: Lalito Olivo MD   Previous ECG: no previous ECG available  Rhythm: sinus rhythm    Clinical impression: normal ECG               Objective:     Physical Exam   Constitutional: He is oriented to person, place, and time. He appears well-developed.   HENT:   Head: Normocephalic.   Eyes: Conjunctivae are  normal.   Neck: Normal range of motion.   Cardiovascular: Normal rate, regular rhythm and normal heart sounds.   Pulmonary/Chest: Breath sounds normal.   Abdominal: Soft. Bowel sounds are normal.   Musculoskeletal: Normal range of motion. He exhibits no edema.   Neurological: He is alert and oriented to person, place, and time.   Skin: Skin is warm and dry.   Psychiatric: He has a normal mood and affect. His behavior is normal.   Vitals reviewed.      Lab Review:       Assessment:          Diagnosis Plan   1. Chest tightness            Plan:         1.  Chest tightness/shortness of breath.  With treatment of his pulmonary status all of the symptoms have dramatically improved.  He however has not taken the concept of may be something wrong with his heart very well.  He is very very worried about it was very evident.  His EKG was normal his physical exam was also normal.  In light of that I do not think this is his heart.  He was able to walk 18 holes this week and with no problems.  At this point I encouraged him to exercise and will do him more good than any further testing at this point.  His symptoms should recur he was told to contact my office.

## 2019-08-16 ENCOUNTER — HOSPITAL ENCOUNTER (OUTPATIENT)
Dept: PHYSICAL THERAPY | Facility: HOSPITAL | Age: 39
Setting detail: THERAPIES SERIES
Discharge: HOME | End: 2019-08-16

## 2019-08-16 DIAGNOSIS — M54.6 ACUTE MIDLINE THORACIC BACK PAIN: Primary | ICD-10-CM

## 2019-08-16 PROCEDURE — 97110 THERAPEUTIC EXERCISES: CPT

## 2019-08-16 NOTE — THERAPY TREATMENT NOTE
Outpatient Physical Therapy Ortho Treatment Note   [place]     Patient Name: Hiram Ho  : 1980  MRN: [id number]  Today's Date: 2019      Visit Date: 2019    Visit Dx:    ICD-10-CM ICD-9-CM   1. Acute midline thoracic back pain M54.6 724.1       Patient Active Problem List   Diagnosis   • Routine health maintenance   • Family history of GI malignancy        Past Medical History:   Diagnosis Date   • Asian flu type A    • Asthma    • Earache    • Environmental and seasonal allergies         Past Surgical History:   Procedure Laterality Date   • COLONOSCOPY N/A 2017    Procedure: COLONOSCOPY TO CECUM;  Surgeon: Stephane Soto MD;  Location: [facility] ENDOSCOPY;  Service:    • DENTAL PROCEDURE     • MANDIBLE FRACTURE SURGERY                         PT Assessment/Plan     Row Name 1915          PT Assessment    Assessment Comments  Pt reports soreness (L) side after completing ther ex, however states he feels better at completion of session. Instructed pt proper technique with lifting weighted items.   -KM        PT Plan    PT Plan Comments  Plan to see pt 1x/week.  -KM       User Key  (r) = Recorded By, (t) = Taken By, (c) = Cosigned By    Initials Name Provider Type    Arcelia Galvez PTA Physical Therapy Assistant          Modalities     Row Name 19 0615             Moist Heat    MH Applied  Yes  -KM      Location  thoracolumbar spine with MH with pt supine 90/90  -KM      Rx Minutes  15 mins  -KM      MH Prior to Rx  Yes  -KM         ELECTRICAL STIMULATION    Attended/Unattended  Unattended  -KM      Stimulation Type  IFC  -KM      Location/Electrode Placement/Other  thoracolumbar spine with MH and pt supine 90/90  -KM        User Key  (r) = Recorded By, (t) = Taken By, (c) = Cosigned By    Initials Name Provider Type    Arcelia Galvez PTA Physical Therapy Assistant        Exercises     Row Name 19 0615             Subjective Comments    Subjective  Comments  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 States he notices steady improvement with overall symptoms.  -KM         Exercise 1    Exercise Name 1  Hamstring stretch bilateral  -KM      Cueing 1  Verbal;Tactile  -KM      Reps 1  10  -KM      Time 1  10 secs  -KM         Exercise 2    Exercise Name 2  SKTC stretch bilateral  -KM      Cueing 2  Verbal;Tactile  -KM      Reps 2  10  -KM      Time 2  10 secs  -KM         Exercise 3    Exercise Name 3  LTR stretch bilateral  -KM      Cueing 3  Verbal;Tactile  -KM      Reps 3  10  -KM      Time 3  10 secs  -KM         Exercise 4    Exercise Name 4  Sidelying rotational stretch bilateral  -KM      Cueing 4  Verbal;Tactile  -KM      Reps 4  10  -KM      Time 4  10 secs  -KM         Exercise 5    Exercise Name 5  Prayer stretch-bilateral  -KM      Cueing 5  Verbal;Tactile  -KM      Reps 5  10  -KM      Time 5  10 secs  -KM         Exercise 6    Exercise Name 6  Levator strtch-bilateral  -KM      Cueing 6  Verbal;Demo  -KM      Reps 6  10  -KM      Time 6  10 secs  -KM         Exercise 7    Exercise Name 7  Upper Trap stretch-bilateral  -KM      Cueing 7  Verbal;Demo  -KM      Reps 7  10   -KM      Time 7  10 secs  -KM         Exercise 8    Exercise Name 8  Prone chest raise  -KM      Cueing 8  Verbal;Tactile  -KM      Reps 8  25  -KM         Exercise 9    Exercise Name 9  Trunk rotation in standing vs theraband  -KM      Cueing 9  Verbal;Demo  -KM      Reps 9  25  -KM      Time 9  gold  -KM         Exercise 10    Exercise Name 10  Prone press up  -KM      Cueing 10  Verbal;Tactile  -KM      Reps 10  20  -KM         Exercise 11    Exercise Name 11  TB Rows  -KM      Reps 11  25  -KM      Time 11  Gold  -KM         Exercise 12    Exercise Name 12  TB Ext  -KM      Reps 12  25  -KM      Time 12  Gold  -KM         Exercise 13    Exercise Name 13  B. Prone Y/T  -KM      Reps 13  25x each  -KM      Time 13   3#  -KM        User Key  (r) = Recorded By, (t) = Taken By, (c) = Cosigned By    Initials Name Provider Type    Arcelia Galvez PTA Physical Therapy Assistant                                          Time Calculation:   Start Time: 0615  Stop Time: 0655  Time Calculation (min): 40 min  Therapy Charges for Today     Code Description Service Date Service Provider Modifiers Qty    19354558694 HC PT THER PROC EA 15 MIN 8/16/2019 Arcelia Johnson PTA GP 1                    Arcelia Johnson PTA  8/16/2019

## 2019-08-23 ENCOUNTER — HOSPITAL ENCOUNTER (OUTPATIENT)
Dept: PHYSICAL THERAPY | Facility: HOSPITAL | Age: 39
Setting detail: THERAPIES SERIES
Discharge: HOME | End: 2019-08-23

## 2019-08-23 DIAGNOSIS — M54.6 ACUTE MIDLINE THORACIC BACK PAIN: Primary | ICD-10-CM

## 2019-08-23 PROCEDURE — 97110 THERAPEUTIC EXERCISES: CPT

## 2019-08-23 NOTE — THERAPY TREATMENT NOTE
Outpatient Physical Therapy Ortho Treatment Note   [place]     Patient Name: Hiram Ho  : 1980  MRN: [id number]  Today's Date: 2019      Visit Date: 2019    Visit Dx:    ICD-10-CM ICD-9-CM   1. Acute midline thoracic back pain M54.6 724.1       Patient Active Problem List   Diagnosis   • Routine health maintenance   • Family history of GI malignancy        Past Medical History:   Diagnosis Date   • Asian flu type A    • Asthma    • Earache    • Environmental and seasonal allergies         Past Surgical History:   Procedure Laterality Date   • COLONOSCOPY N/A 2017    Procedure: COLONOSCOPY TO CECUM;  Surgeon: Stephane Soto MD;  Location: [facility] ENDOSCOPY;  Service:    • DENTAL PROCEDURE     • MANDIBLE FRACTURE SURGERY                         PT Assessment/Plan     Row Name 19 0610          PT Assessment    Assessment Comments  Pt reports the exercises are becoming more tolerable. Instructed pt to keep up with the stretches daily and to try DTM with a tennis ball   -KM        PT Plan    PT Plan Comments  Trial of US to (L) medial scapula. Pt to complete HEP  -KM       User Key  (r) = Recorded By, (t) = Taken By, (c) = Cosigned By    Initials Name Provider Type    Arcelia Galvez PTA Physical Therapy Assistant          Modalities     Row Name 19 0610             Subjective Comments    Subjective Comments  Pt states his continues to notice some tightness/trigger point area (L) inferior,medial scapula, however he states he continues to notice improvement even since starting back to school. Pt states he has not been very compliant with his HEP  -KM         Moist Heat    MH Applied  Yes  -KM      Location  thoracolumbar spine with  with pt supine 90/90  -KM      Rx Minutes  15 mins  -KM      MH Prior to Rx  Yes  -KM         ELECTRICAL STIMULATION    Attended/Unattended  Unattended  -KM      Stimulation Type  IFC  -KM      Location/Electrode Placement/Other   thoracolumbar spine with MH and pt supine 90/90  -KM        User Key  (r) = Recorded By, (t) = Taken By, (c) = Cosigned By    Initials Name Provider Type    Arcelia Galvez PTA Physical Therapy Assistant        Exercises     Row Name 08/23/19 0610             Subjective Comments    Subjective Comments  Pt states his continues to notice some tightness/trigger point area (L) inferior,medial scapula, however he states he continues to notice improvement even since starting back to school. Pt states he has not been very compliant with his HEP  -KM         Exercise 1    Exercise Name 1  Hamstring stretch bilateral  -KM      Cueing 1  Verbal;Tactile  -KM      Reps 1  10  -KM      Time 1  10 secs  -KM         Exercise 2    Exercise Name 2  SKTC stretch bilateral  -KM      Cueing 2  Verbal;Tactile  -KM      Reps 2  10  -KM      Time 2  10 secs  -KM         Exercise 3    Exercise Name 3  LTR stretch bilateral  -KM      Cueing 3  Verbal;Tactile  -KM      Reps 3  10  -KM      Time 3  10 secs  -KM         Exercise 4    Exercise Name 4  Sidelying rotational stretch bilateral  -KM      Cueing 4  Verbal;Tactile  -KM      Reps 4  10  -KM      Time 4  10 secs  -KM         Exercise 5    Exercise Name 5  Prayer stretch-bilateral  -KM      Cueing 5  Verbal;Tactile  -KM      Reps 5  10  -KM      Time 5  10 secs  -KM         Exercise 6    Exercise Name 6  Levator strtch-bilateral  -KM      Cueing 6  Verbal;Demo  -KM      Reps 6  10  -KM      Time 6  10 secs  -KM         Exercise 7    Exercise Name 7  Upper Trap stretch-bilateral  -KM      Cueing 7  Verbal;Demo  -KM      Reps 7  10   -KM      Time 7  10 secs  -KM         Exercise 8    Exercise Name 8  Prone chest raise  -KM      Cueing 8  Verbal;Tactile  -KM      Reps 8  25  -KM         Exercise 9    Exercise Name 9  Trunk rotation in standing vs theraband  -KM      Cueing 9  Verbal;Demo  -KM      Reps 9  25  -KM      Time 9  gold  -KM         Exercise 10    Exercise Name 10  Prone  press up  -KM      Cueing 10  Verbal;Tactile  -KM      Reps 10  20  -KM         Exercise 11    Exercise Name 11  TB Rows  -KM      Reps 11  25  -KM      Time 11  Gold  -KM         Exercise 12    Exercise Name 12  TB Ext  -KM      Reps 12  25  -KM      Time 12  Gold  -KM         Exercise 13    Exercise Name 13  B. Prone Y/T  -KM      Reps 13  25x each  -KM      Time 13  3#  -KM        User Key  (r) = Recorded By, (t) = Taken By, (c) = Cosigned By    Initials Name Provider Type    Arcelia Galvez PTA Physical Therapy Assistant                                          Time Calculation:   Start Time: 0610  Stop Time: 0652  Time Calculation (min): 42 min  Therapy Charges for Today     Code Description Service Date Service Provider Modifiers Qty    35709292540 HC PT THER PROC EA 15 MIN 8/23/2019 Arcelia Johnson PTA GP 1                    Arcelia Johnson PTA  8/23/2019

## 2019-09-09 ENCOUNTER — HOSPITAL ENCOUNTER (OUTPATIENT)
Dept: PHYSICAL THERAPY | Facility: HOSPITAL | Age: 39
Setting detail: THERAPIES SERIES
Discharge: HOME | End: 2019-09-09

## 2019-09-09 DIAGNOSIS — M54.6 ACUTE MIDLINE THORACIC BACK PAIN: Primary | ICD-10-CM

## 2019-09-09 PROCEDURE — 97110 THERAPEUTIC EXERCISES: CPT | Performed by: PHYSICAL THERAPIST

## 2019-09-09 PROCEDURE — G0283 ELEC STIM OTHER THAN WOUND: HCPCS | Performed by: PHYSICAL THERAPIST

## 2019-09-09 NOTE — THERAPY TREATMENT NOTE
Outpatient Physical Therapy Ortho Treatment Note   [facility]     Patient Name: Hiram Ho  : 1980  MRN: [id number]  Today's Date: 2019      Visit Date: 2019    Visit Dx:    ICD-10-CM ICD-9-CM   1. Acute midline thoracic back pain M54.6 724.1       Patient Active Problem List   Diagnosis   • Routine health maintenance   • Family history of GI malignancy        Past Medical History:   Diagnosis Date   • Asian flu type A    • Asthma    • Earache    • Environmental and seasonal allergies         Past Surgical History:   Procedure Laterality Date   • COLONOSCOPY N/A 2017    Procedure: COLONOSCOPY TO CECUM;  Surgeon: Stephane Soto MD;  Location: [facility] ENDOSCOPY;  Service:    • DENTAL PROCEDURE     • MANDIBLE FRACTURE SURGERY         PT Ortho     Row Name 1910       Subjective Comments    Subjective Comments  Pt states he developed increased ack pain again last week. He states it feels real tight again.  -GC       Cervical Palpation    Middle Traps  Bilateral:;Guarded/taut;Tender  -GC    Lower Traps  Bilateral:;Tender;Guarded/taut  -GC       Lumbosacral Palpation    Quadratus Lumborum  Bilateral:;Tender;Guarded/taut  -GC    Erector Spinae (Paraspinals)  Bilateral:;Tender;Guarded/taut  -GC       Head/Neck/Trunk    Trunk Extension AROM  75% range  -GC    Trunk Flexion AROM  75% range  -GC    Trunk Lt Lateral Flexion AROM  75% range  -GC    Trunk Rt Lateral Flexion AROM  75% range  -GC    Trunk Lt Rotation AROM  75% range  -GC    Trunk Rt Rotation AROM  75% range  -GC       Lower Extremity Flexibility    Hamstrings  Bilateral:;Mildly limited  -GC    Hip Flexors  Bilateral:;Mildly limited  -GC    Hip External Rotators  Bilateral:;Mildly limited  -GC    Hip Internal Rotators  Bilateral:;Mildly limited  -GC    Quadratus Lumborum  Bilateral:;Moderately limited  -GC      User Key  (r) = Recorded By, (t) = Taken By, (c) = Cosigned By    Initials Name Provider Type    Caleb Hogan, PT  Physical Therapist                      PT Assessment/Plan     Row Name 09/09/19 0610          PT Assessment    Assessment Comments  Pt does have increased muscular tightness and decreased trunk ROM compared to previous visits. He did feel better afer the MH/IFC and the stretching.  -GC        PT Plan    PT Plan Comments  Pt is to follow up with MD regarding the increase in his pain.  -GC       User Key  (r) = Recorded By, (t) = Taken By, (c) = Cosigned By    Initials Name Provider Type    Caleb Hogan PT Physical Therapist          Modalities     Row Name 09/09/19 0610             Moist Heat    MH Applied  Yes  -GC      Location  thoracolumbar spine with MH with pt supine 90/90  -GC      Rx Minutes  15 mins  -GC      MH Prior to Rx  Yes  -GC         ELECTRICAL STIMULATION    Attended/Unattended  Unattended  -GC      Stimulation Type  IFC  -GC      Location/Electrode Placement/Other  thoracolumbar spine with MH and pt supine 90/90  -GC       PT E-Stim Unattended (Manual) Minutes  20  -GC        User Key  (r) = Recorded By, (t) = Taken By, (c) = Cosigned By    Initials Name Provider Type    Caleb Hogan, PT Physical Therapist        OP Exercises     Row Name 09/09/19 0610             Subjective Comments    Subjective Comments  Pt states he developed increased ack pain again last week. He states it feels real tight again.  -GC         Exercise 1    Exercise Name 1  Hamstring stretch bilateral  -GC      Cueing 1  Verbal;Tactile  -GC      Reps 1  10  -GC      Time 1  10 secs  -GC         Exercise 2    Exercise Name 2  SKTC stretch bilateral  -GC      Cueing 2  Verbal;Tactile  -GC      Reps 2  10  -GC      Time 2  10 secs  -GC         Exercise 3    Exercise Name 3  LTR stretch bilateral  -GC      Cueing 3  Verbal;Tactile  -GC      Reps 3  10  -GC      Time 3  10 secs  -GC        User Key  (r) = Recorded By, (t) = Taken By, (c) = Cosigned By    Initials Name Provider Type    Caleb Hogan, PT Physical  Therapist                                          Time Calculation:   Start Time: 0610  Stop Time: 0657  Time Calculation (min): 47 min  Therapy Charges for Today     Code Description Service Date Service Provider Modifiers Qty    73572645628 HC PT ELECTRICAL STIM UNATTENDED 9/9/2019 Caleb Collado, PT  1    [id number]  PT THER PROC EA 15 MIN 9/9/2019 Caleb Collado, PT GP 1                    Caleb Collado, PT  9/9/2019
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
## 2019-09-13 ENCOUNTER — OFFICE VISIT (OUTPATIENT)
Dept: INTERNAL MEDICINE | Age: 39
End: 2019-09-13

## 2019-09-13 VITALS
SYSTOLIC BLOOD PRESSURE: 118 MMHG | WEIGHT: 217 LBS | TEMPERATURE: 97.7 F | HEIGHT: 74 IN | BODY MASS INDEX: 27.85 KG/M2 | HEART RATE: 73 BPM | DIASTOLIC BLOOD PRESSURE: 62 MMHG | OXYGEN SATURATION: 98 %

## 2019-09-13 DIAGNOSIS — M54.6 ACUTE MIDLINE THORACIC BACK PAIN: ICD-10-CM
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
## 2019-09-13 DIAGNOSIS — R20.9 ABNORMAL ARM SENSATION: ICD-10-CM

## 2019-09-13 PROCEDURE — 99214 OFFICE O/P EST MOD 30 MIN: CPT | Performed by: NURSE PRACTITIONER

## 2019-09-13 RX ORDER — MONTELUKAST SODIUM 10 MG/1
TABLET ORAL DAILY
Refills: 12 | COMMUNITY
Start: 2019-09-01

## 2019-09-16 ENCOUNTER — APPOINTMENT (OUTPATIENT)
Dept: PHYSICAL THERAPY | Facility: HOSPITAL | Age: 39
End: 2019-09-16

## 2019-09-26 ENCOUNTER — TRANSCRIBE ORDERS (OUTPATIENT)
Dept: ADMINISTRATIVE | Facility: HOSPITAL | Age: 39
End: 2019-09-26

## 2019-09-26 DIAGNOSIS — Z13.6 ENCOUNTER FOR SCREENING FOR VASCULAR DISEASE: Primary | ICD-10-CM

## 2019-10-14 ENCOUNTER — HOSPITAL ENCOUNTER (OUTPATIENT)
Dept: CARDIOLOGY | Facility: HOSPITAL | Age: 39
Discharge: HOME | End: 2019-10-14
Admitting: SURGERY

## 2019-10-14 VITALS
HEART RATE: 60 BPM | DIASTOLIC BLOOD PRESSURE: 71 MMHG | HEIGHT: 74 IN | SYSTOLIC BLOOD PRESSURE: 130 MMHG | WEIGHT: 219 LBS | BODY MASS INDEX: 28.11 KG/M2

## 2019-10-14 DIAGNOSIS — Z13.6 ENCOUNTER FOR SCREENING FOR VASCULAR DISEASE: ICD-10-CM

## 2019-10-14 LAB
BH CV ECHO MEAS - DIST AO DIAM: 1.38 CM
BH CV VAS BP LEFT ARM: NORMAL MMHG
BH CV VAS BP RIGHT ARM: NORMAL MMHG
BH CV XLRA MEAS - MID AO DIAM: 1.71 CM
BH CV XLRA MEAS - PAD LEFT ABI DP: 1.15
BH CV XLRA MEAS - PAD LEFT ABI PT: 1.15
BH CV XLRA MEAS - PAD LEFT ARM: 130 MMHG
BH CV XLRA MEAS - PAD LEFT LEG DP: 150 MMHG
BH CV XLRA MEAS - PAD LEFT LEG PT: 150 MMHG
BH CV XLRA MEAS - PAD RIGHT ABI DP: 1.12
BH CV XLRA MEAS - PAD RIGHT ABI PT: 1.13
BH CV XLRA MEAS - PAD RIGHT ARM: 128 MMHG
BH CV XLRA MEAS - PAD RIGHT LEG DP: 146 MMHG
BH CV XLRA MEAS - PAD RIGHT LEG PT: 148 MMHG
BH CV XLRA MEAS - PROX AO DIAM: 1.84 CM
BH CV XLRA MEAS LEFT ICA/CCA RATIO: 1.03
BH CV XLRA MEAS LEFT MID CCA PSV: NORMAL CM/SEC
BH CV XLRA MEAS LEFT MID ICA PSV: NORMAL CM/SEC
BH CV XLRA MEAS LEFT PROX ECA PSV: NORMAL CM/SEC
BH CV XLRA MEAS RIGHT ICA/CCA RATIO: 1.22
BH CV XLRA MEAS RIGHT MID CCA PSV: NORMAL CM/SEC
BH CV XLRA MEAS RIGHT MID ICA PSV: NORMAL CM/SEC
BH CV XLRA MEAS RIGHT PROX ECA PSV: NORMAL CM/SEC

## 2019-10-14 PROCEDURE — 93799 UNLISTED CV SVC/PROCEDURE: CPT

## 2019-10-23 ENCOUNTER — OFFICE VISIT (OUTPATIENT)
Dept: INTERNAL MEDICINE | Age: 39
End: 2019-10-23

## 2019-10-23 VITALS
HEIGHT: 74 IN | WEIGHT: 221 LBS | RESPIRATION RATE: 13 BRPM | HEART RATE: 64 BPM | DIASTOLIC BLOOD PRESSURE: 60 MMHG | OXYGEN SATURATION: 98 % | SYSTOLIC BLOOD PRESSURE: 110 MMHG | BODY MASS INDEX: 28.36 KG/M2 | TEMPERATURE: 97.6 F

## 2019-10-23 DIAGNOSIS — Z80.0 FAMILY HISTORY OF GI MALIGNANCY: ICD-10-CM

## 2019-10-23 DIAGNOSIS — T78.40XD ALLERGIC STATE, SUBSEQUENT ENCOUNTER: ICD-10-CM

## 2019-10-23 DIAGNOSIS — Z00.00 ANNUAL PHYSICAL EXAM: Primary | ICD-10-CM

## 2019-10-23 PROBLEM — T78.40XA ALLERGIES: Status: ACTIVE | Noted: 2019-10-23

## 2019-10-23 PROCEDURE — 99396 PREV VISIT EST AGE 40-64: CPT | Performed by: INTERNAL MEDICINE

## 2019-10-23 NOTE — PROGRESS NOTES
"  Hiram Ho is a 39 y.o. male who presents with   Chief Complaint   Patient presents with   • Annual Exam     Former patient of Dr. Wilks   • Allergies     Sees Dr. Melissa Oconnor   • FHx Colon Cancer     Paternal grandmother.  She  at age 40 by history from the patient   .    39-year-old male.  Former patient of Dr. Wilks.  Presents for an annual physical with any necessary lab updates.  The patient however tells me that he saw his allergist (Dr.Tolis Oconnor)  in August for some problems of wheezing and shortness of breath that had occurred after he had been outside doing yard work.  Apparently the end result of all of that was that his allergist wanted him to see a cardiologist because of the shortness of breath.  The patient admits that this \"planted a seed\" in his mind that there was \"something wrong with my heart\".  He eventually went to [facility] where he had a carotid artery ultrasound which was normal, and abdominal aortic ultrasound which was negative for AAA, and peripheral arterial screening which was negative for PAD.  In addition on  he had a lipid panel showing a cholesterol of 204, triglycerides of 220, HDL of 33 and an LDL of 128.  According to the American Heart Association is estimated 10-year cardiovascular risk with these numbers as they are is 1.8% which puts him in a \"low risk category\".  In spite of all this the patient says he has had this persistent thought in his mind that he has a heart condition and as such he has not been on his treadmill for about 3 months.  He has absolutely no physical symptoms to suggest anything wrong with his heart.  He does not have any chest pain, chest pressure, exertionally related symptoms of chest pain or chest pressure.  There is no nausea, vomiting or diaphoresis with exercise.  And in general it would appear that he is worried about something that is nonexistent.  His other major concern is diabetes but his blood sugar recently was tested at 94 " on his recent cardiac evaluation.  Again there is nothing to suggest diabetes.  The biggest majority of time in this patient's office visit for his physical was spent dealing with these mental concerns about his cardiovascular state.      Allergies   This is a chronic problem. The current episode started more than 1 year ago. The problem has been waxing and waning. Treatments tried: As listed per medication list.  And as prescribed by his allergist. The treatment provided significant relief.        The following portions of the patient's history were reviewed and updated as appropriate: allergies, current medications, past family history, past medical history, past social history, past surgical history and problem list.    Review of Systems   Constitutional: Negative.    HENT: Negative.    Eyes: Negative.    Respiratory: Negative.    Cardiovascular: Negative.    Genitourinary: Negative.    Musculoskeletal: Negative.    Skin: Negative.    Neurological: Negative.    Psychiatric/Behavioral: Negative.        Objective   Physical Exam   Constitutional: He is oriented to person, place, and time. He appears well-developed and well-nourished. No distress.   HENT:   Head: Normocephalic and atraumatic.   Right Ear: External ear normal.   Left Ear: External ear normal.   Nose: Nose normal.   Mouth/Throat: Oropharynx is clear and moist. No oropharyngeal exudate.   Eyes: Conjunctivae and EOM are normal. Pupils are equal, round, and reactive to light. Right eye exhibits no discharge. Left eye exhibits no discharge. No scleral icterus.   Neck: Normal range of motion. Neck supple. No JVD present. No tracheal deviation present. No thyromegaly present.   Neck exam negative.  Carotid auscultation normal-no bruits heard.   Cardiovascular: Normal rate, regular rhythm, normal heart sounds and intact distal pulses. Exam reveals no gallop and no friction rub.   No murmur heard.  Pulmonary/Chest: Effort normal and breath sounds normal. No  respiratory distress. He has no wheezes. He has no rales. He exhibits no tenderness.   Abdominal: Soft. Bowel sounds are normal. He exhibits no distension and no mass. There is no tenderness. No hernia.   Genitourinary:   Genitourinary Comments: Deferred.  Asymptomatic   Musculoskeletal: Normal range of motion. He exhibits no edema, tenderness or deformity.   Lymphadenopathy:     He has no cervical adenopathy.   Neurological: He is alert and oriented to person, place, and time. He has normal reflexes. He displays normal reflexes. No cranial nerve deficit. He exhibits normal muscle tone. Coordination normal.   Skin: Skin is warm and dry. No rash noted. He is not diaphoretic. No erythema. No pallor.   Psychiatric: He has a normal mood and affect. His behavior is normal. Judgment and thought content normal.   Nursing note and vitals reviewed.      Assessment/Plan   Hiram was seen today for annual exam, allergies and fhx colon cancer.    Diagnoses and all orders for this visit:    Annual physical exam    Allergic state, subsequent encounter    Family history of GI malignancy      Plan: As noted above, the biggest majority of time spent in this patient's allotted time for his physical exam was spent in reassuring this patient that in my opinion there is nothing to indicate he has any cardiovascular disease or metabolic issues referable to diabetes.  His labs that were done on October 14 were reviewed with him and will not be repeated on today's visit.    We discussed the possibility of doing a coronary calcium score, a nuclear medicine perfusion scan and echocardiogram but in my opinion these are not necessary.  He elected to defer these at this time.    I have suggested to him that he lead a normal life, exercise as he chooses and follow-up in 1 year for a physical exam with lab updates.    In my opinion, in retrospect based on his history it sounds as if he had an asthma-like shortness of breath/wheezing based on his  "prior past history of \"allergies\" and it was triggered by yardwork at the time.    Included in today's exam was face to face time spent in preventative counseling regarding weight loss, daily exercise, and minimizing sweets and carbohydrates.           "

## 2020-11-05 ENCOUNTER — OFFICE VISIT (OUTPATIENT)
Dept: INTERNAL MEDICINE | Age: 40
End: 2020-11-05

## 2020-11-05 VITALS
RESPIRATION RATE: 13 BRPM | BODY MASS INDEX: 28.53 KG/M2 | TEMPERATURE: 97.3 F | OXYGEN SATURATION: 99 % | SYSTOLIC BLOOD PRESSURE: 110 MMHG | HEART RATE: 61 BPM | HEIGHT: 74 IN | WEIGHT: 222.3 LBS | DIASTOLIC BLOOD PRESSURE: 60 MMHG

## 2020-11-05 DIAGNOSIS — R35.1 NOCTURIA: ICD-10-CM

## 2020-11-05 DIAGNOSIS — Z23 NEED FOR INFLUENZA VACCINATION: ICD-10-CM

## 2020-11-05 DIAGNOSIS — Z00.00 HEALTHCARE MAINTENANCE: ICD-10-CM

## 2020-11-05 DIAGNOSIS — T78.40XD ALLERGY, SUBSEQUENT ENCOUNTER: ICD-10-CM

## 2020-11-05 DIAGNOSIS — Z00.00 ANNUAL PHYSICAL EXAM: Primary | ICD-10-CM

## 2020-11-05 PROBLEM — J45.20 MILD INTERMITTENT ASTHMA WITHOUT COMPLICATION: Status: ACTIVE | Noted: 2020-11-05

## 2020-11-05 PROCEDURE — 90471 IMMUNIZATION ADMIN: CPT | Performed by: INTERNAL MEDICINE

## 2020-11-05 PROCEDURE — 90686 IIV4 VACC NO PRSV 0.5 ML IM: CPT | Performed by: INTERNAL MEDICINE

## 2020-11-05 PROCEDURE — 99396 PREV VISIT EST AGE 40-64: CPT | Performed by: INTERNAL MEDICINE

## 2020-11-05 NOTE — PROGRESS NOTES
"  Hiram Ho is a 40 y.o. male who presents with   Chief Complaint   Patient presents with   • Annual Exam     Last physical exam October 23, 2019   • Allergies/asthma     Sees Dr. Melissa Oconnor-allergist   • Nocturia     Nighttime urinary frequency dependent upon fluid consumption prior to bedtime   .    40-year-old male presents for his annual physical with lab updates.  He has no problems or complaints.  He does have a history of allergies/asthma and sees Dr. Oconnor for this as noted above.  His current medications reflect this problem and his current medications also are controlling this problem he says.  He does have some nighttime urinary frequency but largely dependent upon fluid consumption prior to bedtime.  There is a past family history of colon cancer, his paternal grandmother passed away at age 39 from it.  The patient had a colonoscopy in 2017 that was negative.  Also there is a family history of diabetes.  His mother apparently was a type I diabetic insulin-dependent and passed away from kidney issues when she refused to continue with dialysis.  Patient himself says that a couple of years ago while weed eating he developed shortness of breath and thought he was having an asthma attack.  He saw his allergist who felt it probably was but decided to send him to cardiology for an evaluation.  That evaluation did not show any evidence of cardiac problems but apparently the seeds of cardiac disease were planted in his head at the time he says and it took him a \"about a year\" to get over \"worrying about my heart\".  He does exercise with regularity on a treadmill he says and has no problems.       /60   Pulse 61   Temp 97.3 °F (36.3 °C)   Resp 13   Ht 188 cm (74.02\")   Wt 101 kg (222 lb 4.8 oz)   SpO2 99%   BMI 28.53 kg/m²     The following portions of the patient's history were reviewed and updated as appropriate: allergies, current medications, past family history, past medical history, past " social history, past surgical history and problem list.    Review of Systems   Constitutional: Negative.    HENT: Negative.    Eyes: Negative.    Respiratory: Negative.    Cardiovascular: Negative.    Genitourinary: Negative.    Musculoskeletal: Negative.    Skin: Negative.    Neurological: Negative.    Psychiatric/Behavioral: Negative.        Objective   Physical Exam  Vitals signs and nursing note reviewed.   Constitutional:       General: He is not in acute distress.     Appearance: He is well-developed. He is not diaphoretic.   HENT:      Head: Normocephalic and atraumatic.      Right Ear: External ear normal.      Left Ear: External ear normal.      Nose: Nose normal.      Mouth/Throat:      Pharynx: No oropharyngeal exudate.   Eyes:      General: No scleral icterus.        Right eye: No discharge.         Left eye: No discharge.      Conjunctiva/sclera: Conjunctivae normal.      Pupils: Pupils are equal, round, and reactive to light.   Neck:      Musculoskeletal: Normal range of motion and neck supple.      Thyroid: No thyromegaly.      Vascular: No JVD.      Trachea: No tracheal deviation.      Comments: Neck exam negative.  Carotid auscultation normal-no bruits heard.  Cardiovascular:      Rate and Rhythm: Normal rate and regular rhythm.      Heart sounds: Normal heart sounds. No murmur. No friction rub. No gallop.    Pulmonary:      Effort: Pulmonary effort is normal. No respiratory distress.      Breath sounds: Normal breath sounds. No wheezing or rales.   Chest:      Chest wall: No tenderness.   Abdominal:      General: Bowel sounds are normal. There is no distension.      Palpations: Abdomen is soft. There is no mass.      Tenderness: There is no abdominal tenderness.      Hernia: No hernia is present.   Genitourinary:     Comments: Nocturia history as noted above from fluid consumption but overall no prostate symptoms of slow stream.  ALEJO deferred at this time per request  Musculoskeletal: Normal range  of motion.         General: No tenderness or deformity.   Lymphadenopathy:      Cervical: No cervical adenopathy.   Skin:     General: Skin is warm and dry.      Coloration: Skin is not pale.      Findings: No erythema or rash.   Neurological:      Mental Status: He is alert and oriented to person, place, and time.      Cranial Nerves: No cranial nerve deficit.      Motor: No abnormal muscle tone.      Coordination: Coordination normal.      Deep Tendon Reflexes: Reflexes are normal and symmetric. Reflexes normal.   Psychiatric:         Behavior: Behavior normal.         Thought Content: Thought content normal.         Judgment: Judgment normal.         Assessment/Plan   Diagnoses and all orders for this visit:    1. Annual physical exam (Primary)  -     CBC & Differential  -     Comprehensive Metabolic Panel  -     Lipid Panel  -     Urinalysis With Microscopic If Indicated (No Culture) - Urine, Clean Catch  -     TSH+Free T4    2. Need for influenza vaccination  -     FluLaval Quad >6 Months ([id number])    3. Allergy, subsequent encounter  -     CBC & Differential  -     Comprehensive Metabolic Panel    4. Healthcare maintenance  -     Hepatitis C Antibody    5. Nocturia  -     Comprehensive Metabolic Panel  -     Urinalysis With Microscopic If Indicated (No Culture) - Urine, Clean Catch  -     PSA DIAGNOSTIC      Plan: Labs as above for the issues as outlined.  Continue allergy care with Dr. Oconnor.    Assuming today's labs are acceptable, a 1 year follow-up physical with lab updates has been advised.    At this point I see nothing from the past nor do I see any present issues that suggest any cardiac problems.  I have encouraged him to engage in normal activity as he sees fit.    Included in today's exam was face to face time spent in preventative counseling regarding weight loss, daily exercise, and minimizing sweets and carbohydrates.  Additionally, health maintenance needs were discussed and reviewed as  well.

## 2020-11-06 LAB
ALBUMIN SERPL-MCNC: 4.7 G/DL (ref 3.5–5.2)
ALBUMIN/GLOB SERPL: 2.4 G/DL
ALP SERPL-CCNC: 42 U/L (ref 39–117)
ALT SERPL-CCNC: 31 U/L (ref 1–41)
APPEARANCE UR: CLEAR
AST SERPL-CCNC: 21 U/L (ref 1–40)
BASOPHILS # BLD AUTO: 0.03 10*3/MM3 (ref 0–0.2)
BASOPHILS NFR BLD AUTO: 0.5 % (ref 0–1.5)
BILIRUB SERPL-MCNC: 0.6 MG/DL (ref 0–1.2)
BILIRUB UR QL STRIP: NEGATIVE
BUN SERPL-MCNC: 14 MG/DL (ref 6–20)
BUN/CREAT SERPL: 13.3 (ref 7–25)
CALCIUM SERPL-MCNC: 9 MG/DL (ref 8.6–10.5)
CHLORIDE SERPL-SCNC: 99 MMOL/L (ref 98–107)
CHOLEST SERPL-MCNC: 205 MG/DL (ref 0–200)
CO2 SERPL-SCNC: 28.2 MMOL/L (ref 22–29)
COLOR UR: YELLOW
CREAT SERPL-MCNC: 1.05 MG/DL (ref 0.76–1.27)
EOSINOPHIL # BLD AUTO: 0.08 10*3/MM3 (ref 0–0.4)
EOSINOPHIL NFR BLD AUTO: 1.3 % (ref 0.3–6.2)
ERYTHROCYTE [DISTWIDTH] IN BLOOD BY AUTOMATED COUNT: 12.6 % (ref 12.3–15.4)
GLOBULIN SER CALC-MCNC: 2 GM/DL
GLUCOSE SERPL-MCNC: 96 MG/DL (ref 65–99)
GLUCOSE UR QL: NEGATIVE
HCT VFR BLD AUTO: 51.3 % (ref 37.5–51)
HCV AB S/CO SERPL IA: <0.1 S/CO RATIO (ref 0–0.9)
HDLC SERPL-MCNC: 43 MG/DL (ref 40–60)
HGB BLD-MCNC: 17.1 G/DL (ref 13–17.7)
HGB UR QL STRIP: NEGATIVE
IMM GRANULOCYTES # BLD AUTO: 0.01 10*3/MM3 (ref 0–0.05)
IMM GRANULOCYTES NFR BLD AUTO: 0.2 % (ref 0–0.5)
KETONES UR QL STRIP: NEGATIVE
LDLC SERPL CALC-MCNC: 132 MG/DL (ref 0–100)
LEUKOCYTE ESTERASE UR QL STRIP: NEGATIVE
LYMPHOCYTES # BLD AUTO: 2.02 10*3/MM3 (ref 0.7–3.1)
LYMPHOCYTES NFR BLD AUTO: 33.7 % (ref 19.6–45.3)
MCH RBC QN AUTO: 32.1 PG (ref 26.6–33)
MCHC RBC AUTO-ENTMCNC: 33.3 G/DL (ref 31.5–35.7)
MCV RBC AUTO: 96.2 FL (ref 79–97)
MONOCYTES # BLD AUTO: 0.6 10*3/MM3 (ref 0.1–0.9)
MONOCYTES NFR BLD AUTO: 10 % (ref 5–12)
NEUTROPHILS # BLD AUTO: 3.25 10*3/MM3 (ref 1.7–7)
NEUTROPHILS NFR BLD AUTO: 54.3 % (ref 42.7–76)
NITRITE UR QL STRIP: NEGATIVE
NRBC BLD AUTO-RTO: 0 /100 WBC (ref 0–0.2)
PH UR STRIP: 6.5 [PH] (ref 5–8)
PLATELET # BLD AUTO: 222 10*3/MM3 (ref 140–450)
POTASSIUM SERPL-SCNC: 4.5 MMOL/L (ref 3.5–5.2)
PROT SERPL-MCNC: 6.7 G/DL (ref 6–8.5)
PROT UR QL STRIP: NEGATIVE
PSA SERPL-MCNC: 0.81 NG/ML (ref 0–4)
RBC # BLD AUTO: 5.33 10*6/MM3 (ref 4.14–5.8)
SODIUM SERPL-SCNC: 138 MMOL/L (ref 136–145)
SP GR UR: 1.02 (ref 1–1.03)
T4 FREE SERPL-MCNC: 1.27 NG/DL (ref 0.93–1.7)
TRIGL SERPL-MCNC: 165 MG/DL (ref 0–150)
TSH SERPL DL<=0.005 MIU/L-ACNC: 3.4 UIU/ML (ref 0.27–4.2)
UROBILINOGEN UR STRIP-MCNC: NORMAL MG/DL
VLDLC SERPL CALC-MCNC: 30 MG/DL (ref 5–40)
WBC # BLD AUTO: 5.99 10*3/MM3 (ref 3.4–10.8)

## 2020-12-01 ENCOUNTER — OFFICE VISIT (OUTPATIENT)
Dept: INTERNAL MEDICINE | Age: 40
End: 2020-12-01

## 2020-12-01 DIAGNOSIS — R07.89 CHEST TIGHTNESS: ICD-10-CM

## 2020-12-01 DIAGNOSIS — R06.02 SHORTNESS OF BREATH: Primary | ICD-10-CM

## 2020-12-01 PROCEDURE — 99442 PR PHYS/QHP TELEPHONE EVALUATION 11-20 MIN: CPT | Performed by: INTERNAL MEDICINE

## 2020-12-01 NOTE — PROGRESS NOTES
"  Hiram Ho is a 40 y.o. male who presents with   Chief Complaint   Patient presents with   • Shortness of breath-chest tightness     1 week   .    40-year-old male who called the office earlier with complaints of chest tightness and shortness of breath and was advised to go to the emergency room for further evaluation however he insisted on having a telephone conversation/telephone visit first before going there.  After talking to him on the telephone his history is that for the past week he has been having shortness of breath and chest tightness without any relation to exertion.  He denies any nausea, vomiting or diaphoresis.  He also says he has a \"sore scratchy throat, fatigue and a tongue that itches\".  He has had no cough or fever.  He actually was seen 6 days ago at a local walk-in clinic and was diagnosed with \"bronchitis\" and was given a Z-Kike.  COVID-19 testing at that time was negative he says.  He also has a history of asthma and allergies and currently sees Dr. Oconnor for this.       There were no vitals taken for this visit. Due to the nature of today's telephone visit, vital signs could not be performed.      The following portions of the patient's history were reviewed and updated as appropriate: allergies, current medications, past medical history and problem list.    Review of Systems   Constitutional: Negative.    HENT: Negative.    Eyes: Negative.    Respiratory: Positive for chest tightness and shortness of breath.    Cardiovascular: Negative.    Genitourinary: Negative.    Musculoskeletal: Negative.    Skin: Negative.    Neurological: Negative.    Psychiatric/Behavioral: Negative.        Objective   Physical Exam Due to the nature of today's telephone visit a physical exam could not be performed.  Physically he offered no complaints or problems on today's discussion however.      Assessment/Plan   Diagnoses and all orders for this visit:    1. Shortness of breath (Primary)    2. Chest " tightness      Plan: Based on his symptoms cardiac etiology is a possibility.  Also chest wall costochondritis would be a possibility.  Asthma/allergy symptoms could also be a possibility.    I have agreed with the advice that he should be seen at the emergency room and he indicated he would do so for further evaluation of his problem.    Total time spent with this patient on this telephone video visit-13 minutes    Verbal consent obtained for this telephone visit.

## 2020-12-11 ENCOUNTER — TELEPHONE (OUTPATIENT)
Dept: INTERNAL MEDICINE | Age: 40
End: 2020-12-11

## 2020-12-11 NOTE — TELEPHONE ENCOUNTER
This type of request is best done face-to-face with an office visit and review of ER visit/findings/lab data

## 2020-12-11 NOTE — TELEPHONE ENCOUNTER
Spoke with pt would like to speak with Dr Gabriel and discuss recent Er visit and covid testing and issues with the heart

## 2020-12-14 ENCOUNTER — TELEPHONE (OUTPATIENT)
Dept: INTERNAL MEDICINE | Age: 40
End: 2020-12-14

## 2020-12-14 ENCOUNTER — TELEMEDICINE (OUTPATIENT)
Dept: INTERNAL MEDICINE | Age: 40
End: 2020-12-14

## 2020-12-14 DIAGNOSIS — Z20.822 SUSPECTED COVID-19 VIRUS INFECTION: ICD-10-CM

## 2020-12-14 DIAGNOSIS — R07.89 ATYPICAL CHEST PAIN: Primary | ICD-10-CM

## 2020-12-14 PROCEDURE — 99214 OFFICE O/P EST MOD 30 MIN: CPT | Performed by: NURSE PRACTITIONER

## 2020-12-14 NOTE — TELEPHONE ENCOUNTER
----- Message from SUSAN Stark sent at 12/14/2020  9:29 AM EST -----  Regarding: Appt  Please call patient to schedule non fasting labs appt for NO EARLIER than 2-3 weeks from now. Thanks.

## 2020-12-14 NOTE — PROGRESS NOTES
"[facility]  CHRIS Ho / 40 y.o. / male  12/14/2020    VITALS    There were no vitals taken for this visit.    BP Readings from Last 3 Encounters:   11/25/20 133/88   11/05/20 110/60   12/31/19 120/71     Wt Readings from Last 3 Encounters:   11/25/20 99.8 kg (220 lb)   11/05/20 101 kg (222 lb 4.8 oz)   12/19/19 99.8 kg (220 lb)      There is no height or weight on file to calculate BMI.    CC:  Main reason(s) for today's visit: Chest Pain ([facility] fup ) and Shortness of Breath      HPI:   This is a NEW patient as well as a NEW problem to this examiner.     This was an audio and video enabled telemedicine encounter.    Date of emergency department encounter: 12/1/2020  Emergency department: [facility]  Principle Dx: Chest tightness R07.89 786.59     Secondary Dx: Suspected COVID-19 virus infection Z20.828 V01.79     History prior to ED visit: Context: Pt is a 40 yr/o male history of asthma who presents with chest tightness for 7 to 8 days has been constant and associated shortness of air. No loss of taste or smell. He has had tingling to his tongue. No vomiting or diarrhea. Last Wednesday was seen at a walk-in clinic was treated with a Z-Kike for 5 days. He had a Covid test that was negative at that time but states that they did not swab all the way into the back of his nose.    Evaluation/Treatment: CXR, repeat EKG, lab work all WNL        Patient with ongoing issues of \"chest tightness\", fatigue x 3 weeks now. Has been COVID tested twice and negative. Reports SOA somewhat better, still having intermittent chest tightness.  Chest tightness is not worse with exertion or activity.  He is still having significant fatigue.  He is not having any cough.  He does have a history of asthma, has not tried his albuterol to see if this helps the chest tightness as he reports the side effects of the albuterol are uncomfortable for him.  He has tried ibuprofen as per directed by ED " "physician to help with chest pain, which is been somewhat helpful.  He localizes the chest tightness to the midsternal area.  He has a family history of heart issues in his mother, but he reports these only occurred after she had other significant health issues.  There is no early cardiac issues in his family history that he is aware of.    He vocalizes concern about having \"heart issues\" r/t chest pain.     Patient Care Team:  Bandar Gabriel MD as PCP - General (Internal Medicine)  ____________________________________________________________________    ASSESSMENT & PLAN:    This patient has consented to a telehealth visit via phone. The visit was scheduled as a telehealth phone visit to comply with patient safety concerns in accordance with CDC recommendations.  All vitals recorded within this visit are reported by the patient.  I spent 29 minutes in total including but not limited to the 20 minutes spent in direct conversation with this patient.       1. Atypical chest pain  Discussed with patient that symptoms do seem consistent with Covid 19 infection.  However, he has had 2 - PCR tests.  Advised patient there still is some potential for false negative testing.  Advised he can come to the office in the next 2 to 3 weeks for antibody test, which he would like to do.  Considering his concerns about possible cardiac issue, advised patient we will refer him to cardiology for further evaluation.  Could also consider that chest tightness is some type of costochondritis as it does seem to improve with ibuprofen use and somewhat worse with deep inspiration.    - SARS-CoV-2 Antibodies (Roche); Future  - Ambulatory Referral to Cardiology    2. Suspected COVID-19 virus infection    - SARS-CoV-2 Antibodies (Roche); Future  - Ambulatory Referral to Cardiology    Orders Placed This Encounter   Procedures   • SARS-CoV-2 Antibodies (Roche)   • Ambulatory Referral to Cardiology     No orders of the defined types were placed in " this encounter.      Summary/Discussion:    This patient has consented to a telehealth visit via phone. The visit was scheduled as a telehealth phone visit to comply with patient safety concerns in accordance with CDC recommendations.  All vitals recorded within this visit are reported by the patient.  I spent 29 minutes in total including but not limited to the 20 minutes spent in direct conversation with this patient.       1. Atypical chest pain  Discussed with patient that symptoms do seem consistent with Covid 19 infection.  However, he has had 2 - PCR tests.  Advised patient there still is some potential for false negative testing.  Advised he can come to the office in the next 2 to 3 weeks for antibody test, which he would like to do.  Considering his concerns about possible cardiac issue, advised patient we will refer him to cardiology for further evaluation.  Could also consider that chest tightness is some type of costochondritis as it does seem to improve with ibuprofen use and somewhat worse with deep inspiration.    - SARS-CoV-2 Antibodies (Roche); Future  - Ambulatory Referral to Cardiology    2. Suspected COVID-19 virus infection    - SARS-CoV-2 Antibodies (Roche); Future  - Ambulatory Referral to Cardiology      No follow-ups on file.    No future appointments.  ____________________________________________________________________    REVIEW OF SYSTEMS    Review of Systems   Constitutional: Positive for fatigue. Negative for activity change, appetite change and unexpected weight change.   HENT: Negative for tinnitus.    Eyes: Negative for visual disturbance.   Respiratory: Positive for chest tightness and shortness of breath. Negative for cough.    Cardiovascular: Negative for chest pain, palpitations and leg swelling.   Neurological: Negative for dizziness, light-headedness and headaches.         PHYSICAL EXAMINATION    Physical Exam  Psychiatric:         Speech: Speech normal.         Behavior:  Behavior is cooperative.         Thought Content: Thought content normal.         Judgment: Judgment normal.           REVIEWED DATA:    Labs:   No visits with results within 14 Day(s) from this visit.   Latest known visit with results is:   Admission on 11/25/2020, Discharged on 11/25/2020   Component Date Value Ref Range Status   • SARS-CoV-2, TOBY 11/25/2020 Not Detected  Not Detected Final    Comment: This nucleic acid amplification test was developed and its performance  characteristics determined by [employer]. Nucleic acid  amplification tests include PCR and TMA. This test has not been FDA  cleared or approved. This test has been authorized by FDA under an  Emergency Use Authorization (EUA). This test is only authorized for  the duration of time the declaration that circumstances exist  justifying the authorization of the emergency use of in vitro  diagnostic tests for detection of SARS-CoV-2 virus and/or diagnosis  of COVID-19 infection under section 564(b)(1) of the Act, 21 U.S.C.  360bbb-3(b) (1), unless the authorization is terminated or revoked  sooner.  When diagnostic testing is negative, the possibility of a false  negative result should be considered in the context of a patient's  recent exposures and the presence of clinical signs and symptoms  consistent with COVID-19. An individual without symptoms of COVID-19  and who is not shedding SARS-CoV-2 virus would                            expect to have a  negative (not detected) result in this assay.         Imaging:   No results found.       Medical Tests:         Summary of old records / correspondence / consultant report:   ED encounter note re: issues addressed on HPI    Request outside records:       ALLERGIES  No Known Allergies     MEDICATIONS  Current Outpatient Medications on File Prior to Visit   Medication Sig   • BREO ELLIPTA 200-25 MCG/INH inhaler 1 puff Daily.   • cetirizine (ZyrTEC) 10 MG tablet Take 10 mg by mouth daily.   •  fluticasone (FLONASE) 50 MCG/ACT nasal spray 2 sprays into the nostril(s) as directed by provider Daily.   • montelukast (SINGULAIR) 10 MG tablet Take  by mouth Daily.   • PROAIR RESPICLICK 108 (90 Base) MCG/ACT inhaler 1-2 inhalations every 6-8 hours as needed   • azithromycin (ZITHROMAX) 500 MG tablet Take  1 tablet daily for 5 days.     No current facility-administered medications on file prior to visit.        PFSH:     The following portions of the patient's history were reviewed and updated as appropriate: Allergies / Current Medications / Past Medical History / Surgical History / Social History / Family History    PROBLEM LIST   Patient Active Problem List   Diagnosis   • Routine health maintenance   • Family history of GI malignancy   • Allergies   • Mild intermittent asthma without complication       PAST MEDICAL HISTORY  Past Medical History:   Diagnosis Date   • Allergic    • Asian flu type A    • Asthma    • Earache    • Environmental and seasonal allergies        SURGICAL HISTORY  Past Surgical History:   Procedure Laterality Date   • COLONOSCOPY N/A 2/13/2017    Procedure: DR Soto; COLONOSCOPY TO CECUM;  Surgeon: Stephane Soto MD;  [facility];  Service:    • DENTAL PROCEDURE     • MANDIBLE FRACTURE SURGERY         SOCIAL HISTORY  Social History     Socioeconomic History   • Marital status:      Spouse name: Not on file   • Number of children: 4   • Years of education: Not on file   • Highest education level: Not on file   Occupational History   • Occupation: Teacher   Tobacco Use   • Smoking status: Never Smoker   • Smokeless tobacco: Never Used   • Tobacco comment: caffeine use 6 cups coffee daily   Substance and Sexual Activity   • Alcohol use: Yes     Comment: RARELY   • Drug use: No   • Sexual activity: Yes     Partners: Female     Comment: 4 children   Social History Narrative    -Business- [employer]       FAMILY HISTORY  Family History   Problem  Relation Age of Onset   • Diabetes Mother         Dialysis;  2020   • Cancer Mother         Kidney   • Heart disease Mother         CHF?   • Colon cancer Paternal Grandmother    • Cancer Paternal Grandmother 39        Colon   • No Known Problems Father    • No Known Problems Sister    • No Known Problems Brother          **Lili Disclaimer:   Much of this encounter note is an electronic transcription/translation of spoken language to printed text. The electronic translation of spoken language may permit erroneous, or at times, nonsensical words or phrases to be inadvertently transcribed. Although I have reviewed the note for such errors, some may still exist.

## 2020-12-14 NOTE — PATIENT INSTRUCTIONS
Nonspecific Chest Pain, Adult  Chest pain can be caused by many different conditions. It can be caused by a condition that is life-threatening and requires treatment right away. It can also be caused by something that is not life-threatening. If you have chest pain, it can be hard to know the difference, so it is important to get help right away to make sure that you do not have a serious condition.  Some life-threatening causes of chest pain include:  · Heart attack.  · A tear in the body's main blood vessel (aortic dissection).  · Inflammation around your heart (pericarditis).  · A problem in the lungs, such as a blood clot (pulmonary embolism) or a collapsed lung (pneumothorax).  Some non life-threatening causes of chest pain include:  · Heartburn.  · Anxiety or stress.  · Damage to the bones, muscles, and cartilage that make up your chest wall.  · Pneumonia or bronchitis.  · Shingles infection (varicella-zoster virus).  Chest pain can feel like:  · Pain or discomfort on the surface of your chest or deep in your chest.  · Crushing, pressure, aching, or squeezing pain.  · Burning or tingling.  · Dull or sharp pain that is worse when you move, cough, or take a deep breath.  · Pain or discomfort that is also felt in your back, neck, jaw, shoulder, or arm, or pain that spreads to any of these areas.  Your chest pain may come and go. It may also be constant. Your health care provider will do lab tests and other studies to find the cause of your pain. Treatment will depend on the cause of your chest pain.  Follow these instructions at home:  Medicines  · Take over-the-counter and prescription medicines only as told by your health care provider.  · If you were prescribed an antibiotic, take it as told by your health care provider. Do not stop taking the antibiotic even if you start to feel better.  Lifestyle    · Rest as directed by your health care provider.  · Do not use any products that contain nicotine or tobacco,  such as cigarettes and e-cigarettes. If you need help quitting, ask your health care provider.  · Do not drink alcohol.  · Make healthy lifestyle choices as recommended. These may include:  ? Getting regular exercise. Ask your health care provider to suggest some activities that are safe for you.  ? Eating a heart-healthy diet. This includes plenty of fresh fruits and vegetables, whole grains, low-fat (lean) protein, and low-fat dairy products. A dietitian can help you find healthy eating options.  ? Maintaining a healthy weight.  ? Managing any other health conditions you have, such as high blood pressure (hypertension) or diabetes.  ? Reducing stress, such as with yoga or relaxation techniques.  General instructions  · Pay attention to any changes in your symptoms. Tell your health care provider about them or any new symptoms.  · Avoid any activities that cause chest pain.  · Keep all follow-up visits as told by your health care provider. This is important. This includes visits for any further testing if your chest pain does not go away.  Contact a health care provider if:  · Your chest pain does not go away.  · You feel depressed.  · You have a fever.  Get help right away if:  · Your chest pain gets worse.  · You have a cough that gets worse, or you cough up blood.  · You have severe pain in your abdomen.  · You faint.  · You have sudden, unexplained chest discomfort.  · You have sudden, unexplained discomfort in your arms, back, neck, or jaw.  · You have shortness of breath at any time.  · You suddenly start to sweat, or your skin gets clammy.  · You feel nausea or you vomit.  · You suddenly feel lightheaded or dizzy.  · You have severe weakness, or unexplained weakness or fatigue.  · Your heart begins to beat quickly, or it feels like it is skipping beats.  These symptoms may represent a serious problem that is an emergency. Do not wait to see if the symptoms will go away. Get medical help right away. Call your  local emergency services (911 in the [place] Do not drive yourself to the hospital.  Summary  · Chest pain can be caused by a condition that is serious and requires urgent treatment. It may also be caused by something that is not life-threatening.  · If you have chest pain, it is very important to see your health care provider. Your health care provider may do lab tests and other studies to find the cause of your pain.  · Follow your health care provider's instructions on taking medicines, making lifestyle changes, and getting emergency treatment if symptoms become worse.  · Keep all follow-up visits as told by your health care provider. This includes visits for any further testing if your chest pain does not go away.  This information is not intended to replace advice given to you by your health care provider. Make sure you discuss any questions you have with your health care provider.  Document Revised: 06/20/2019 Document Reviewed: 06/20/2019  [employer] Patient Education © 2020 [employer]

## 2020-12-17 ENCOUNTER — APPOINTMENT (OUTPATIENT)
Dept: CT IMAGING | Facility: HOSPITAL | Age: 40
End: 2020-12-17

## 2020-12-17 ENCOUNTER — APPOINTMENT (OUTPATIENT)
Dept: GENERAL RADIOLOGY | Facility: HOSPITAL | Age: 40
End: 2020-12-17

## 2020-12-17 ENCOUNTER — HOSPITAL ENCOUNTER (EMERGENCY)
Facility: HOSPITAL | Age: 40
Discharge: HOME | End: 2020-12-17
Attending: EMERGENCY MEDICINE | Admitting: EMERGENCY MEDICINE

## 2020-12-17 ENCOUNTER — OFFICE VISIT (OUTPATIENT)
Dept: INTERNAL MEDICINE | Age: 40
End: 2020-12-17

## 2020-12-17 VITALS
HEIGHT: 74 IN | WEIGHT: 228.4 LBS | OXYGEN SATURATION: 97 % | TEMPERATURE: 97.3 F | SYSTOLIC BLOOD PRESSURE: 120 MMHG | BODY MASS INDEX: 29.31 KG/M2 | RESPIRATION RATE: 20 BRPM | HEART RATE: 77 BPM | DIASTOLIC BLOOD PRESSURE: 62 MMHG

## 2020-12-17 VITALS
TEMPERATURE: 97.8 F | RESPIRATION RATE: 20 BRPM | DIASTOLIC BLOOD PRESSURE: 79 MMHG | SYSTOLIC BLOOD PRESSURE: 130 MMHG | HEIGHT: 74 IN | BODY MASS INDEX: 28.88 KG/M2 | OXYGEN SATURATION: 95 % | HEART RATE: 63 BPM | WEIGHT: 225 LBS

## 2020-12-17 DIAGNOSIS — R07.9 CHEST PAIN, UNSPECIFIED TYPE: Primary | ICD-10-CM

## 2020-12-17 DIAGNOSIS — R94.31 ST ELEVATION: ICD-10-CM

## 2020-12-17 DIAGNOSIS — R07.89 CHEST TIGHTNESS: ICD-10-CM

## 2020-12-17 DIAGNOSIS — R06.02 SHORTNESS OF BREATH: ICD-10-CM

## 2020-12-17 DIAGNOSIS — R07.89 ATYPICAL CHEST PAIN: Primary | ICD-10-CM

## 2020-12-17 LAB
ALBUMIN SERPL-MCNC: 4.8 G/DL (ref 3.5–5.2)
ALBUMIN/GLOB SERPL: 1.8 G/DL
ALP SERPL-CCNC: 49 U/L (ref 39–117)
ALT SERPL W P-5'-P-CCNC: 24 U/L (ref 1–41)
ANION GAP SERPL CALCULATED.3IONS-SCNC: 7.8 MMOL/L (ref 5–15)
AST SERPL-CCNC: 17 U/L (ref 1–40)
BASOPHILS # BLD AUTO: 0.02 10*3/MM3 (ref 0–0.2)
BASOPHILS NFR BLD AUTO: 0.3 % (ref 0–1.5)
BILIRUB SERPL-MCNC: 0.6 MG/DL (ref 0–1.2)
BUN SERPL-MCNC: 11 MG/DL (ref 6–20)
BUN/CREAT SERPL: 9.1 (ref 7–25)
CALCIUM SPEC-SCNC: 9.6 MG/DL (ref 8.6–10.5)
CHLORIDE SERPL-SCNC: 103 MMOL/L (ref 98–107)
CO2 SERPL-SCNC: 29.2 MMOL/L (ref 22–29)
CREAT SERPL-MCNC: 1.21 MG/DL (ref 0.76–1.27)
D DIMER PPP FEU-MCNC: <0.27 MCGFEU/ML (ref 0–0.49)
DEPRECATED RDW RBC AUTO: 41.5 FL (ref 37–54)
EOSINOPHIL # BLD AUTO: 0.04 10*3/MM3 (ref 0–0.4)
EOSINOPHIL NFR BLD AUTO: 0.7 % (ref 0.3–6.2)
ERYTHROCYTE [DISTWIDTH] IN BLOOD BY AUTOMATED COUNT: 12.4 % (ref 12.3–15.4)
GFR SERPL CREATININE-BSD FRML MDRD: 66 ML/MIN/1.73
GLOBULIN UR ELPH-MCNC: 2.6 GM/DL
GLUCOSE SERPL-MCNC: 90 MG/DL (ref 65–99)
HCT VFR BLD AUTO: 48.1 % (ref 37.5–51)
HGB BLD-MCNC: 17 G/DL (ref 13–17.7)
HOLD SPECIMEN: NORMAL
HOLD SPECIMEN: NORMAL
IMM GRANULOCYTES # BLD AUTO: 0.01 10*3/MM3 (ref 0–0.05)
IMM GRANULOCYTES NFR BLD AUTO: 0.2 % (ref 0–0.5)
LYMPHOCYTES # BLD AUTO: 1.97 10*3/MM3 (ref 0.7–3.1)
LYMPHOCYTES NFR BLD AUTO: 32.7 % (ref 19.6–45.3)
MAGNESIUM SERPL-MCNC: 2.3 MG/DL (ref 1.6–2.6)
MCH RBC QN AUTO: 32.6 PG (ref 26.6–33)
MCHC RBC AUTO-ENTMCNC: 35.3 G/DL (ref 31.5–35.7)
MCV RBC AUTO: 92.1 FL (ref 79–97)
MONOCYTES # BLD AUTO: 0.61 10*3/MM3 (ref 0.1–0.9)
MONOCYTES NFR BLD AUTO: 10.1 % (ref 5–12)
NEUTROPHILS NFR BLD AUTO: 3.37 10*3/MM3 (ref 1.7–7)
NEUTROPHILS NFR BLD AUTO: 56 % (ref 42.7–76)
NRBC BLD AUTO-RTO: 0 /100 WBC (ref 0–0.2)
PLATELET # BLD AUTO: 223 10*3/MM3 (ref 140–450)
PMV BLD AUTO: 10.7 FL (ref 6–12)
POTASSIUM SERPL-SCNC: 3.9 MMOL/L (ref 3.5–5.2)
PROT SERPL-MCNC: 7.4 G/DL (ref 6–8.5)
QT INTERVAL: 386 MS
RBC # BLD AUTO: 5.22 10*6/MM3 (ref 4.14–5.8)
SODIUM SERPL-SCNC: 140 MMOL/L (ref 136–145)
TROPONIN T SERPL-MCNC: <0.01 NG/ML (ref 0–0.03)
WBC # BLD AUTO: 6.02 10*3/MM3 (ref 3.4–10.8)
WHOLE BLOOD HOLD SPECIMEN: NORMAL
WHOLE BLOOD HOLD SPECIMEN: NORMAL

## 2020-12-17 PROCEDURE — 93000 ELECTROCARDIOGRAM COMPLETE: CPT | Performed by: NURSE PRACTITIONER

## 2020-12-17 PROCEDURE — 80053 COMPREHEN METABOLIC PANEL: CPT | Performed by: EMERGENCY MEDICINE

## 2020-12-17 PROCEDURE — 71275 CT ANGIOGRAPHY CHEST: CPT

## 2020-12-17 PROCEDURE — 93010 ELECTROCARDIOGRAM REPORT: CPT | Performed by: INTERNAL MEDICINE

## 2020-12-17 PROCEDURE — 0 IOPAMIDOL PER 1 ML: Performed by: EMERGENCY MEDICINE

## 2020-12-17 PROCEDURE — 99284 EMERGENCY DEPT VISIT MOD MDM: CPT

## 2020-12-17 PROCEDURE — 83735 ASSAY OF MAGNESIUM: CPT | Performed by: EMERGENCY MEDICINE

## 2020-12-17 PROCEDURE — 85025 COMPLETE CBC W/AUTO DIFF WBC: CPT | Performed by: EMERGENCY MEDICINE

## 2020-12-17 PROCEDURE — 85379 FIBRIN DEGRADATION QUANT: CPT | Performed by: EMERGENCY MEDICINE

## 2020-12-17 PROCEDURE — 84484 ASSAY OF TROPONIN QUANT: CPT | Performed by: EMERGENCY MEDICINE

## 2020-12-17 PROCEDURE — 93005 ELECTROCARDIOGRAM TRACING: CPT | Performed by: EMERGENCY MEDICINE

## 2020-12-17 PROCEDURE — 99214 OFFICE O/P EST MOD 30 MIN: CPT | Performed by: NURSE PRACTITIONER

## 2020-12-17 PROCEDURE — 71046 X-RAY EXAM CHEST 2 VIEWS: CPT

## 2020-12-17 RX ORDER — ASPIRIN 325 MG
325 TABLET ORAL ONCE
Status: COMPLETED | OUTPATIENT
Start: 2020-12-17 | End: 2020-12-17

## 2020-12-17 RX ORDER — SODIUM CHLORIDE 0.9 % (FLUSH) 0.9 %
10 SYRINGE (ML) INJECTION AS NEEDED
Status: DISCONTINUED | OUTPATIENT
Start: 2020-12-17 | End: 2020-12-17 | Stop reason: HOSPADM

## 2020-12-17 RX ADMIN — ASPIRIN 325 MG: 325 TABLET ORAL at 18:37

## 2020-12-17 RX ADMIN — IOPAMIDOL 95 ML: 755 INJECTION, SOLUTION INTRAVENOUS at 19:19

## 2020-12-17 NOTE — ED NOTES
"Patient presents to ED after follow up with pmd.  Patient complains of intermittent chest tightness, currently a 1 that will go up to a 6 or 7.  Denies any precipitating factors, states \"its usually worse in the morning when I wake up.\"  Denies any shortness of breath.  VSS.  Was seen at [facility] last week for same complaint, has not followed up with cardiology.  VSS.  ABC's intact.  Alert and oriented x4.  Patient wearing mask, nurse wearing mask and protective eyewear.  Will continue to monitor.       Fabián Swain RN  12/17/20 1221    "

## 2020-12-17 NOTE — ED TRIAGE NOTES
.Pt masked on arrival, staff masked    Pt reports intermittent chest pain since before Thanksgiving, EKG done PTA; pt has had multiple md visits and seen at [facility] ED for same

## 2020-12-17 NOTE — ED PROVIDER NOTES
EMERGENCY DEPARTMENT ENCOUNTER    Room Number:  35/35  Date of encounter:  12/17/2020  PCP: Bandar Gabriel MD  Historian: Patient      HPI:  Chief Complaint: Chest pain  A complete HPI/ROS/PMH/PSH/SH/FH are unobtainable due to: None    Context: Hiram Ho is a 40 y.o. male who presents to the ED from primary care provider's office after being evaluated for chronic chest pain for approximately 3 to 4 weeks.  Patient states that the pain started the day before Thanksgiving, have been daily describes a pressure in the lower anterior chest/epigastrium region without radiation.  No aggravating or alleviating factors.  Denies any associated dizziness, diaphoresis, nausea, vomiting.  Reports increased shortness of breath with the pain when it exacerbates.  Pain is currently a 1 out of 10.  Patient states that he was seen at [facility] on 1 December but did not receive any definitive answers.  Patient denies any recent surgeries or recent travel.  No significant family history of coronary artery disease.  Denies smoking.  Denies fevers, chills, cough, diarrhea.  Reports eating and drinking normally with no significant aggravation of pain.  Has been taking intermittent ibuprofen with minimal relief.  Reports having tested negative for Covid throughout this course.      MEDICAL RECORD REVIEW    Chart review in [facility] shows ED visit December 1, 2020 at [place] with no emergent abnormalities noted    PAST MEDICAL HISTORY  Active Ambulatory Problems     Diagnosis Date Noted   • Routine health maintenance 07/25/2016   • Family history of GI malignancy 01/26/2017   • Allergies 10/23/2019   • Mild intermittent asthma without complication 11/05/2020     Resolved Ambulatory Problems     Diagnosis Date Noted   • Diarrhea 01/26/2017   • Rectal/anal hemorrhage 01/26/2017     Past Medical History:   Diagnosis Date   • Allergic    • Asian flu type A    • Asthma    • Earache    • Environmental and seasonal allergies          PAST  SURGICAL HISTORY  Past Surgical History:   Procedure Laterality Date   • COLONOSCOPY N/A 2017    Procedure: DR Soto; COLONOSCOPY TO CECUM;  Surgeon: Stephane Soto MD;  Location: [facility] ENDOSCOPY;  Service:    • DENTAL PROCEDURE     • MANDIBLE FRACTURE SURGERY           FAMILY HISTORY  Family History   Problem Relation Age of Onset   • Diabetes Mother         Dialysis;  2020   • Cancer Mother         Kidney   • Heart disease Mother         CHF?   • Colon cancer Paternal Grandmother    • Cancer Paternal Grandmother 39        Colon   • No Known Problems Father    • No Known Problems Sister    • No Known Problems Brother          SOCIAL HISTORY  Social History     Socioeconomic History   • Marital status:      Spouse name: Not on file   • Number of children: 4   • Years of education: Not on file   • Highest education level: Not on file   Occupational History   • Occupation: Teacher   Tobacco Use   • Smoking status: Never Smoker   • Smokeless tobacco: Never Used   • Tobacco comment: caffeine use 6 cups coffee daily   Substance and Sexual Activity   • Alcohol use: Yes     Comment: RARELY   • Drug use: No   • Sexual activity: Yes     Partners: Female     Comment: 4 children   Social History Narrative    -[employer] [place]         ALLERGIES  Patient has no known allergies.        REVIEW OF SYSTEMS  Review of Systems     All systems reviewed and negative except for those discussed in HPI.       PHYSICAL EXAM    I have reviewed the triage vital signs and nursing notes.    ED Triage Vitals [20 1527]   Temp Heart Rate Resp BP SpO2   97.8 °F (36.6 °C) 76 20 137/94 100 %      Temp src Heart Rate Source Patient Position BP Location FiO2 (%)   -- -- -- -- --       Physical Exam  General: Awake, alert, no acute distress, nontoxic, nondiaphoretic  HEENT: Mucous membranes moist, atraumatic, EOMI  Neck: Full ROM  Pulm: Symmetric chest rise, nonlabored, lungs  CTAB  Cardiovascular: Regular rate and rhythm, intact distal pulses, no murmur/rub/gallops  GI: Soft, nontender, nondistended, no rebound, no guarding, bowel sounds present  MSK: Full ROM, no deformity  Skin: Warm, dry  Neuro: Alert and oriented x 3, GCS 15, moving all extremities, no focal deficits  Psych: Calm, cooperative      Surgical mask, protective eye goggles, and gloves used during this encounter. Patient in surgical mask.      LAB RESULTS  Recent Results (from the past 24 hour(s))   ECG 12 Lead    Collection Time: 12/17/20  5:27 PM   Result Value Ref Range    QT Interval 386 ms   Comprehensive Metabolic Panel    Collection Time: 12/17/20  5:29 PM    Specimen: Blood   Result Value Ref Range    Glucose 90 65 - 99 mg/dL    BUN 11 6 - 20 mg/dL    Creatinine 1.21 0.76 - 1.27 mg/dL    Sodium 140 136 - 145 mmol/L    Potassium 3.9 3.5 - 5.2 mmol/L    Chloride 103 98 - 107 mmol/L    CO2 29.2 (H) 22.0 - 29.0 mmol/L    Calcium 9.6 8.6 - 10.5 mg/dL    Total Protein 7.4 6.0 - 8.5 g/dL    Albumin 4.80 3.50 - 5.20 g/dL    ALT (SGPT) 24 1 - 41 U/L    AST (SGOT) 17 1 - 40 U/L    Alkaline Phosphatase 49 39 - 117 U/L    Total Bilirubin 0.6 0.0 - 1.2 mg/dL    eGFR Non African Amer 66 >60 mL/min/1.73    Globulin 2.6 gm/dL    A/G Ratio 1.8 g/dL    BUN/Creatinine Ratio 9.1 7.0 - 25.0    Anion Gap 7.8 5.0 - 15.0 mmol/L   Troponin    Collection Time: 12/17/20  5:29 PM    Specimen: Blood   Result Value Ref Range    Troponin T <0.010 0.000 - 0.030 ng/mL   Light Blue Top    Collection Time: 12/17/20  5:29 PM   Result Value Ref Range    Extra Tube hold for add-on    Green Top (Gel)    Collection Time: 12/17/20  5:29 PM   Result Value Ref Range    Extra Tube Hold for add-ons.    Lavender Top    Collection Time: 12/17/20  5:29 PM   Result Value Ref Range    Extra Tube hold for add-on    Gold Top - SST    Collection Time: 12/17/20  5:29 PM   Result Value Ref Range    Extra Tube Hold for add-ons.    CBC Auto Differential    Collection  Time: 12/17/20  5:29 PM    Specimen: Blood   Result Value Ref Range    WBC 6.02 3.40 - 10.80 10*3/mm3    RBC 5.22 4.14 - 5.80 10*6/mm3    Hemoglobin 17.0 13.0 - 17.7 g/dL    Hematocrit 48.1 37.5 - 51.0 %    MCV 92.1 79.0 - 97.0 fL    MCH 32.6 26.6 - 33.0 pg    MCHC 35.3 31.5 - 35.7 g/dL    RDW 12.4 12.3 - 15.4 %    RDW-SD 41.5 37.0 - 54.0 fl    MPV 10.7 6.0 - 12.0 fL    Platelets 223 140 - 450 10*3/mm3    Neutrophil % 56.0 42.7 - 76.0 %    Lymphocyte % 32.7 19.6 - 45.3 %    Monocyte % 10.1 5.0 - 12.0 %    Eosinophil % 0.7 0.3 - 6.2 %    Basophil % 0.3 0.0 - 1.5 %    Immature Grans % 0.2 0.0 - 0.5 %    Neutrophils, Absolute 3.37 1.70 - 7.00 10*3/mm3    Lymphocytes, Absolute 1.97 0.70 - 3.10 10*3/mm3    Monocytes, Absolute 0.61 0.10 - 0.90 10*3/mm3    Eosinophils, Absolute 0.04 0.00 - 0.40 10*3/mm3    Basophils, Absolute 0.02 0.00 - 0.20 10*3/mm3    Immature Grans, Absolute 0.01 0.00 - 0.05 10*3/mm3    nRBC 0.0 0.0 - 0.2 /100 WBC   Magnesium    Collection Time: 12/17/20  5:29 PM    Specimen: Blood   Result Value Ref Range    Magnesium 2.3 1.6 - 2.6 mg/dL   D-dimer, Quantitative    Collection Time: 12/17/20  5:29 PM    Specimen: Blood   Result Value Ref Range    D-Dimer, Quantitative <0.27 0.00 - 0.49 MCGFEU/mL       Ordered the above labs and independently reviewed the results.        RADIOLOGY  Xr Chest 2 View    Result Date: 12/17/2020  XR CHEST 2 VW-  HISTORY: Male who is 40 years-old,  chest pain  TECHNIQUE: Frontal and lateral views of the chest  COMPARISON: None available  FINDINGS: Heart, mediastinum and pulmonary vasculature are unremarkable. No focal pulmonary consolidation, pleural effusion, or pneumothorax. No acute osseous process.      No evidence for acute pulmonary process. Follow-up as clinical indications persist.  This report was finalized on 12/17/2020 3:49 PM by Dr. Bandar Mike M.D.      Ct Angiogram Chest    Result Date: 12/17/2020  CT ANGIOGRAM CHEST-  INDICATIONS: Chest pain.  Radiation  dose reduction techniques were utilized, including automated exposure control and exposure modulation based on body size.  TECHNIQUE: CT angiography of the chest with three-dimensional reconstructions  COMPARISON: None available  FINDINGS:  No pulmonary embolism. No aortic dissection.  The heart size is normal without pericardial effusion. A few small subcentimeter short axis mediastinal lymph nodes are seen that are not significant by size criteria.  The airways appear clear.  No pleural effusion or pneumothorax.  The lungs show no focal pulmonary consolidation or mass. Old granulomatous disease is apparent. Minimal atelectasis.  Upper abdominal structures show no acute findings. The gallbladder is contracted. Degenerative changes are seen in the spine. No acute fracture is identified.       No pulmonary embolism.  This report was finalized on 12/17/2020 8:17 PM by Dr. Bandar Mike M.D.        I ordered the above noted radiological studies. Reviewed by me.  See dictation for official radiology interpretation.      PROCEDURES    Procedures  EKG    EKG Time: 1727  Rhythm/Rate: Sinus rhythm with rate of 60  Normal axis  Normal intervals  Some mild diffuse ST elevations which could be pericarditis versus benign early repolarization  No STEMI     Interpreted Contemporaneously by me, independently viewed  EKG reports reviewed from [place] on December 1 December 2 which describes similar findings though unable to visualize these EKGs in [facility]      MEDICATIONS GIVEN IN ER    Medications   sodium chloride 0.9 % flush 10 mL (has no administration in time range)   aspirin tablet 325 mg (325 mg Oral Given 12/17/20 1837)   iopamidol (ISOVUE-370) 76 % injection 100 mL (95 mL Intravenous Given by Other 12/17/20 1919)         PROGRESS, DATA ANALYSIS, CONSULTS, AND MEDICAL DECISION MAKING    All labs have been independently reviewed by me.  All radiology studies have been reviewed by me and discussed with radiologist dictating  the report.   EKG's independently viewed and interpreted by me.  Discussion below represents my analysis of pertinent findings related to patient's condition, differential diagnosis, treatment plan and final disposition.    I have low suspicion for acute emergent process, however, will escalate care with a CT angiogram of the chest to further evaluate for possible PE though I think this is unlikely.  Will check for any evidence of pericardial effusion, pulmonary issues including pneumonia, edema, pleural effusions, esophagitis, ACS, renal failure, electrolyte abnormalities, among others.    HEART score 1    ED Course as of Dec 17 2118   Thu Dec 17, 2020   1812 D-Dimer, Quant: <0.27 [DC]   1812 Troponin T: <0.010 [DC]   1812 Creatinine: 1.21 [DC]   1951 WBC: 6.02 [DC]   1952 Hemoglobin: 17.0 [DC]   2049 Patient updated on the reassuring lab and imaging findings today, at this point no evidence of ACS, PE, dissection, pneumonia or esophagitis, no evidence of significant gastritis.  At this point no emergent process identified.  Advised follow-up with cardiology, GI, PCP, advised him to start with a week of anti-inflammatories if this is potentially mild pericarditis, no pericardial effusion noted.  Advised him to start Pepcid as well.  ED return for worsening symptoms as needed.    [DC]      ED Course User Index  [DC] Minesh Pearson MD       AS OF 21:18 EST VITALS:    BP - 130/79  HR - 63  TEMP - 97.8 °F (36.6 °C)  02 SATS - 95%        DIAGNOSIS  Final diagnoses:   Chest pain, unspecified type         DISPOSITION  DISCHARGE    Patient discharged in stable condition.    Reviewed implications of results, diagnosis, meds, responsibility to follow up, warning signs and symptoms of possible worsening, potential complications and reasons to return to ER.    Patient/Family voiced understanding of above instructions.    Discussed plan for discharge, as there is no emergent indication for admission. Patient referred to  primary care provider for BP management due to today's BP. Pt/family is agreeable and understands need for follow up and repeat testing.  Pt is aware that discharge does not mean that nothing is wrong but it indicates no emergency is present that requires admission and they must continue care with follow-up as given below or physician of their choice.     FOLLOW-UP  [facility] Emergency Department  [address] [facility] [place] [address] [phone]    As needed, If symptoms worsen    [facility]  [address]  [place] [address] [phone]  Schedule an appointment as soon as possible for a visit   As needed    [facility]  [address]  [place] [address] [phone]  Schedule an appointment as soon as possible for a visit   As needed         Medication List      No changes were made to your prescriptions during this visit.                    Minesh Pearson MD  12/17/20 8326

## 2020-12-17 NOTE — PROGRESS NOTES
[facility] [place]  Renetta Wilkersony / 40 y.o. / male  12/17/2020      ASSESSMENT & PLAN:    Problem List Items Addressed This Visit     None      Visit Diagnoses     Atypical chest pain    -  Primary    Shortness of breath        Chest tightness        ST elevation            Orders Placed This Encounter   Procedures   • ECG 12 Lead     No orders of the defined types were placed in this encounter.      Summary/Discussion:    1. Atypical chest pain  EKG with evidence of extensive ST elevation.  Considering symptoms, suspect likely pericarditis.  Discussed with patient that he would need evaluation to rule out other heart condition or myocardial ischemia as well.  Advised patient to go to the ER for further evaluation and treatment at this time.  He was escorted down to the ER with medical assistant Joy Mendoza. Called ER Triage to let them know that patient will be coming.     2. Shortness of breath      3. Chest tightness      4. ST elevation          No follow-ups on file.    ____________________________________________________________________    MEDICATIONS  Current Outpatient Medications   Medication Sig Dispense Refill   • BREO ELLIPTA 200-25 MCG/INH inhaler 1 puff Daily.     • cetirizine (ZyrTEC) 10 MG tablet Take 10 mg by mouth daily.     • fluticasone (FLONASE) 50 MCG/ACT nasal spray 2 sprays into the nostril(s) as directed by provider Daily.     • montelukast (SINGULAIR) 10 MG tablet Take  by mouth Daily.  12   • PROAIR RESPICLICK 108 (90 Base) MCG/ACT inhaler 1-2 inhalations every 6-8 hours as needed 1 inhaler 0     No current facility-administered medications for this visit.      Facility-Administered Medications Ordered in Other Visits   Medication Dose Route Frequency Provider Last Rate Last Admin   • aspirin tablet 325 mg  325 mg Oral Once Emergency, Triage Protocol, MD       • sodium chloride 0.9 % flush 10 mL  10 mL Intravenous PRN Emergency, Triage Protocol, MD         "      VITALS:    Visit Vitals  /62 (BP Location: Left arm, Patient Position: Sitting)   Pulse 77   Temp 97.3 °F (36.3 °C) (Temporal)   Resp 20   Ht 188 cm (74.02\")   Wt 104 kg (228 lb 6.4 oz)   SpO2 97%   BMI 29.31 kg/m²       BP Readings from Last 3 Encounters:   12/17/20 137/94   12/17/20 120/62   11/25/20 133/88     Wt Readings from Last 3 Encounters:   12/17/20 104 kg (228 lb 6.4 oz)   11/25/20 99.8 kg (220 lb)   11/05/20 101 kg (222 lb 4.8 oz)      Body mass index is 29.31 kg/m².    CC:  Main reason(s) for today's visit: Shortness of Breath (fup soa chest pain ), Chest Pain, and Fatigue      HPI:     Chest Pain: Patient complains of chest pain. Onset was 3 weeks ago, with worsening course since that time- reports at worst yesterday 6-7/10. The patient describes the pain as intermittent, substernal in nature, does not radiate. Patient rates pain as a 4/10 in intensity.  Associated symptoms are chest pain, chest pressure/discomfort and dyspnea. Aggravating factors are walking.  Alleviating factors are: position change. Patient's cardiac risk factors are family history of premature cardiovascular disease.  Patient's risk factors for DVT/PE: none.     Patient was seen at  11/25/2020 for possible COVID testing with c/o cough, fatigue, etc.     Patient Care Team:  Bandar Gabriel MD as PCP - General (Internal Medicine)    ____________________________________________________________________    REVIEW OF SYSTEMS    Review of Systems   Constitutional: Negative for activity change, appetite change and unexpected weight change.   HENT: Negative for tinnitus.    Eyes: Negative for visual disturbance.   Respiratory: Positive for chest tightness and shortness of breath. Negative for cough.    Cardiovascular: Positive for chest pain. Negative for palpitations and leg swelling.   Neurological: Negative for dizziness, light-headedness and headaches.         PHYSICAL EXAMINATION    Physical Exam  Vitals signs and nursing " note reviewed.   Constitutional:       General: He is not in acute distress.     Appearance: He is well-developed. He is not ill-appearing.   Cardiovascular:      Rate and Rhythm: Normal rate and regular rhythm.      Heart sounds: Normal heart sounds, S1 normal and S2 normal. No murmur.   Pulmonary:      Effort: Pulmonary effort is normal.      Breath sounds: Normal breath sounds. No decreased breath sounds, wheezing, rhonchi or rales.   Skin:     General: Skin is warm and dry.   Neurological:      Mental Status: He is alert and oriented to person, place, and time.   Psychiatric:         Mood and Affect: Mood is anxious.         Speech: Speech normal.         Behavior: Behavior normal. Behavior is cooperative.         Thought Content: Thought content normal.         Judgment: Judgment normal.       ECG 12 Lead    Date/Time: 12/17/2020 3:15 PM  Performed by: Renetta Segovia APRN  Authorized by: Renetta Segovia APRN   Comparison: not compared with previous ECG   Rhythm: sinus rhythm  Conduction: right bundle branch block  ST Elevation: all    Clinical impression: abnormal EKG and pericarditis              REVIEWED DATA:    Labs:     Lab Results   Component Value Date     11/05/2020    K 4.5 11/05/2020    AST 21 11/05/2020    ALT 31 11/05/2020    BUN 14 11/05/2020    CREATININE 1.05 11/05/2020    CREATININE 1.01 08/09/2018    CREATININE 1.10 08/09/2017    EGFRIFNONA 78 11/05/2020    EGFRIFAFRI 94 11/05/2020       No results found for: HGBA1C, GLUCOSE, MICROALBUR    Lab Results   Component Value Date     (H) 11/05/2020     (H) 08/09/2018     (H) 08/09/2017    HDL 43 11/05/2020    HDL 34 (L) 08/09/2018    HDL 46 08/09/2017    TRIG 165 (H) 11/05/2020    TRIG 209 (H) 08/09/2018    TRIG 87 08/09/2017       Lab Results   Component Value Date    TSH 3.400 11/05/2020    FREET4 1.27 11/05/2020       Lab Results   Component Value Date    WBC 6.03 12/01/2020    HGB 17.9 (H) 12/01/2020    HGB 17.1  11/05/2020    HGB 15.7 08/09/2018     12/01/2020       Lab Results   Component Value Date    PROTEIN Negative 11/05/2020    GLUCOSEU Negative 11/05/2020    BLOODU Negative 11/05/2020    NITRITEU Negative 11/05/2020    LEUKOCYTESUR Negative 11/05/2020       Imaging:         Medical Tests:         Summary of old records / correspondence / consultant report:         Request outside records:         ALLERGIES  No Known Allergies     PFSH:     The following portions of the patient's history were reviewed and updated as appropriate: Allergies / Current Medications / Past Medical History / Surgical History / Social History / Family History    PROBLEM LIST   Patient Active Problem List   Diagnosis   • Routine health maintenance   • Family history of GI malignancy   • Allergies   • Mild intermittent asthma without complication       PAST MEDICAL HISTORY  Past Medical History:   Diagnosis Date   • Allergic    • Asian flu type A    • Asthma    • Earache    • Environmental and seasonal allergies        SURGICAL HISTORY  Past Surgical History:   Procedure Laterality Date   • COLONOSCOPY N/A 2/13/2017    Procedure: DR Soto; COLONOSCOPY TO CECUM;  Surgeon: Stephane Soto MD;  [place] [facility] [place] [facility];  Service:    • DENTAL PROCEDURE     • MANDIBLE FRACTURE SURGERY         SOCIAL HISTORY  Social History     Socioeconomic History   • Marital status:      Spouse name: Not on file   • Number of children: 4   • Years of education: Not on file   • Highest education level: Not on file   Occupational History   • Occupation: Teacher   Tobacco Use   • Smoking status: Never Smoker   • Smokeless tobacco: Never Used   • Tobacco comment: caffeine use 6 cups coffee daily   Substance and Sexual Activity   • Alcohol use: Yes     Comment: RARELY   • Drug use: No   • Sexual activity: Yes     Partners: Female     Comment: 4 children   Social History Narrative    -[employer]       FAMILY  HISTORY  Family History   Problem Relation Age of Onset   • Diabetes Mother         Dialysis;  2020   • Cancer Mother         Kidney   • Heart disease Mother         CHF?   • Colon cancer Paternal Grandmother    • Cancer Paternal Grandmother 39        Colon   • No Known Problems Father    • No Known Problems Sister    • No Known Problems Brother          **Lili Disclaimer:   Much of this encounter note is an electronic transcription/translation of spoken language to printed text. The electronic translation of spoken language may permit erroneous, or at times, nonsensical words or phrases to be inadvertently transcribed. Although I have reviewed the note for such errors, some may still exist.

## 2020-12-18 NOTE — DISCHARGE INSTRUCTIONS
Take Pepcid as discussed, 1 week of anti-inflammatories with food, stay well-hydrated with plenty of water, follow-up with cardiology and GI as discussed, follow-up with PCP as needed, ED return for worsening symptoms as needed.  
normal sinus rhythm

## 2020-12-21 ENCOUNTER — OFFICE VISIT (OUTPATIENT)
Dept: CARDIOLOGY | Facility: CLINIC | Age: 40
End: 2020-12-21

## 2020-12-21 ENCOUNTER — TELEPHONE (OUTPATIENT)
Dept: INTERNAL MEDICINE | Age: 40
End: 2020-12-21

## 2020-12-21 VITALS
HEART RATE: 78 BPM | OXYGEN SATURATION: 99 % | WEIGHT: 220 LBS | SYSTOLIC BLOOD PRESSURE: 118 MMHG | BODY MASS INDEX: 28.23 KG/M2 | HEIGHT: 74 IN | DIASTOLIC BLOOD PRESSURE: 80 MMHG

## 2020-12-21 DIAGNOSIS — R07.89 CHEST PAIN, ATYPICAL: Primary | ICD-10-CM

## 2020-12-21 DIAGNOSIS — Z20.822 EXPOSURE TO COVID-19 VIRUS: ICD-10-CM

## 2020-12-21 DIAGNOSIS — R07.89 ATYPICAL CHEST PAIN: Primary | ICD-10-CM

## 2020-12-21 DIAGNOSIS — I51.4 MYOCARDITIS, UNSPECIFIED CHRONICITY, UNSPECIFIED MYOCARDITIS TYPE (HCC): ICD-10-CM

## 2020-12-21 DIAGNOSIS — Z20.822 SUSPECTED COVID-19 VIRUS INFECTION: ICD-10-CM

## 2020-12-21 PROCEDURE — 93000 ELECTROCARDIOGRAM COMPLETE: CPT | Performed by: NURSE PRACTITIONER

## 2020-12-21 PROCEDURE — 99214 OFFICE O/P EST MOD 30 MIN: CPT | Performed by: NURSE PRACTITIONER

## 2020-12-21 RX ORDER — FAMOTIDINE 20 MG/1
20 TABLET, FILM COATED ORAL DAILY
COMMUNITY
End: 2021-02-04

## 2020-12-21 RX ORDER — IBUPROFEN 200 MG
200 TABLET ORAL EVERY 8 HOURS PRN
COMMUNITY
End: 2022-12-12

## 2020-12-21 NOTE — PROGRESS NOTES
Date of Office Visit: 20  Encounter Provider: SUSAN Correa  Place of Service: [facility] CARDIOLOGY  Patient Name: Hiram Ho  :1980    Chief Complaint   Patient presents with   • Chest Pain     [facility] up   :     HPI: Hiram Ho is a 40 y.o. male  with history of asthma, allergies, and atypical chest pain.  He is followed by Dr. Olivo. I will visit with him for the first time today and have reviewed his medical record.    In 2019 he was evaluated for chest tightness.  He apparently had a thorough evaluation by his allergist, Dr. Oconnor and was treated with some inhalers and his symptoms dramatically improved.  He had a normal physical exam and normal ECG so no further cardiac testing was completed.  He then was evaluated in the ER for chest tightness and was diagnosed with acute bronchitis late 2020.  He was prescribed azithromycin.  He was ruled out with negative troponin, negative D-dimer and negative proBNP.  CT of the chest ruled out pulmonary emboli.  He was started on ibuprofen as well as Prilosec.  He followed back up with his PCP who referred him to cardiology for further evaluation and also recommended that he have a Covid antibody test.    He presents today and reports since taking ibuprofen around-the-clock over the weekend he has had some slight improved chest tightness.  He reports the discomfort is midsternal radiating to the left arm this pain is anywhere from a 1-6 on a scale of 10.  It is apparently worse in the morning.  Eating and position changes does not seem to make it worse.  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  He is had no fever or chills.        No Known Allergies    Past Medical History:   Diagnosis Date   • Allergic    • Asian flu type A    • Asthma    • Earache    • Environmental and seasonal allergies        Past Surgical History:   Procedure Laterality Date   • COLONOSCOPY N/A  "2/13/2017    Procedure: DR Soto; COLONOSCOPY TO CECUM;  Surgeon: Stephane Soto MD;  Location: [facility] ENDOSCOPY;  Service:    • DENTAL PROCEDURE     • MANDIBLE FRACTURE SURGERY           Family and social history reviewed.     Review of Systems   Constitution: Positive for malaise/fatigue.   Cardiovascular: Positive for chest pain and dyspnea on exertion.   Psychiatric/Behavioral: The patient is nervous/anxious.      All other systems were reviewed and are negative          Objective:     Vitals:    12/21/20 1313   BP: 118/80   BP Location: Left arm   Pulse: 78   SpO2: 99%   Weight: 99.8 kg (220 lb)   Height: 188 cm (74\")     Body mass index is 28.25 kg/m².    PHYSICAL EXAM:  Constitutional:       General: Not in acute distress.     Appearance: Well-developed. Not diaphoretic.   Eyes:      Conjunctiva/sclera: Conjunctivae normal.   HENT:      Head: Normocephalic.   Neck:      Musculoskeletal: Normal range of motion.      Vascular: No JVD.   Pulmonary:      Effort: Pulmonary effort is normal. No respiratory distress.      Breath sounds: Normal breath sounds. No wheezing. No rhonchi. No rales.   Chest:      Chest wall: Not tender to palpatation.   Cardiovascular:      Normal rate. Regular rhythm.   Abdominal:      General: Bowel sounds are normal. There is no distension.      Palpations: Abdomen is soft.   Musculoskeletal: Normal range of motion.   Skin:     General: Skin is warm and dry. There is no cyanosis.      Findings: No rash.   Neurological:      Mental Status: Alert and oriented to person, place, and time.   Psychiatric:         Behavior: Behavior normal.         Thought Content: Thought content normal.         Judgment: Judgment normal.           ECG 12 Lead    Date/Time: 12/21/2020 5:13 PM  Performed by: Michaela Vergara APRN  Authorized by: Michaela Vergara APRN   Comparison: compared with previous ECG   Similar to previous ECG  Rhythm: sinus rhythm  Rate: normal  Other findings: early " repolarization    Clinical impression: non-specific ECG  Comments: No significant change from 08/2019            Current Outpatient Medications   Medication Sig Dispense Refill   • BREO ELLIPTA 200-25 MCG/INH inhaler 1 puff Daily.     • cetirizine (ZyrTEC) 10 MG tablet Take 10 mg by mouth daily.     • famotidine (PEPCID) 20 MG tablet Take 20 mg by mouth Daily.     • fluticasone (FLONASE) 50 MCG/ACT nasal spray 2 sprays into the nostril(s) as directed by provider Daily.     • ibuprofen (ADVIL,MOTRIN) 200 MG tablet Take 200 mg by mouth Every 8 (Eight) Hours As Needed for Mild Pain .     • montelukast (SINGULAIR) 10 MG tablet Take  by mouth Daily.  12   • PROAIR RESPICLICK 108 (90 Base) MCG/ACT inhaler 1-2 inhalations every 6-8 hours as needed 1 inhaler 0     No current facility-administered medications for this visit.      Assessment:       Diagnosis Plan   1. Chest pain, atypical  MRI Cardiac Function Complete With & Without Morphology    Adult Stress Echo W/ Cont or Stress Agent if Necessary Per Protocol   2. Myocarditis, unspecified chronicity, unspecified myocarditis type (CMS/HCC)  MRI Cardiac Function Complete With & Without Morphology   3. Exposure to COVID-19 virus  MRI Cardiac Function Complete With & Without Morphology        Orders Placed This Encounter   Procedures   • MRI Cardiac Function Complete With & Without Morphology   • ECG 12 Lead     This order was created via procedure documentation   • Adult Stress Echo W/ Cont or Stress Agent if Necessary Per Protocol     Standing Status:   Future     Standing Expiration Date:   12/21/2021     Order Specific Question:   What stress agent will be used?     Answer:   Exercise with Possible Pharmalogic     Order Specific Question:   Reason for exam?     Answer:   Chest Pain     Order Specific Question:   Chest Pain specification?     Answer:   Acute Chest Pain         Plan:       1.  40-year-old gentleman with atypical chest pain he has been ruled out in the ER  recently for myocardial infarction.  He has been treated with ibuprofen for possible pericarditis and has had minimal improvement in his symptoms.  Due to his history of hyperlipidemia and no treatment we will have him return for stress echo.  He is to continue high-dose ibuprofen around-the-clock until the results of that are back next week.   2.  Hyperlipidemia prior  1 month ago not on therapy  3.  Chest tightness and shortness of breath has had negative Covid test on 2 occasions in the last month with his symptoms.  He is to have an antibody test next week per his PCP.  If he has antibodies to Covid then would recommend cardiac MRI to rule out myocarditis  4.  History of asthma  5.  Possible GERD he is to continue Prilosec and keep his GI appointment next month  6.  Degenerative spinal changes  noted on CT of the chest  7. Anxiety  Follow-up to be determined pending results of stress echo, antibody test and possible cardiac MRI            It has been a pleasure to participate in this patient's care.      Thank you,  SUSAN Correa      **I used Dragon to dictate this note:**

## 2020-12-21 NOTE — TELEPHONE ENCOUNTER
----- Message from Joy Mendoza MA sent at 12/21/2020  3:54 PM EST -----  PT COMING IN NEXT Monday FOR LABS PLEASE ADVISE OF WHAT HE NEEDS

## 2020-12-22 DIAGNOSIS — R07.89 ATYPICAL CHEST PAIN: ICD-10-CM

## 2020-12-22 DIAGNOSIS — Z20.822 SUSPECTED COVID-19 VIRUS INFECTION: ICD-10-CM

## 2020-12-24 ENCOUNTER — TRANSCRIBE ORDERS (OUTPATIENT)
Dept: ADMINISTRATIVE | Facility: HOSPITAL | Age: 40
End: 2020-12-24

## 2020-12-24 DIAGNOSIS — Z01.818 OTHER SPECIFIED PRE-OPERATIVE EXAMINATION: Primary | ICD-10-CM

## 2020-12-26 ENCOUNTER — LAB (OUTPATIENT)
Dept: LAB | Facility: HOSPITAL | Age: 40
End: 2020-12-26

## 2020-12-26 DIAGNOSIS — Z01.818 OTHER SPECIFIED PRE-OPERATIVE EXAMINATION: ICD-10-CM

## 2020-12-26 LAB — SARS-COV-2 RNA RESP QL NAA+PROBE: NOT DETECTED

## 2020-12-26 PROCEDURE — C9803 HOPD COVID-19 SPEC COLLECT: HCPCS

## 2020-12-26 PROCEDURE — U0004 COV-19 TEST NON-CDC HGH THRU: HCPCS

## 2020-12-29 ENCOUNTER — HOSPITAL ENCOUNTER (OUTPATIENT)
Dept: CARDIOLOGY | Facility: HOSPITAL | Age: 40
Discharge: HOME | End: 2020-12-29
Admitting: NURSE PRACTITIONER

## 2020-12-29 VITALS
DIASTOLIC BLOOD PRESSURE: 80 MMHG | HEART RATE: 82 BPM | OXYGEN SATURATION: 98 % | WEIGHT: 220 LBS | BODY MASS INDEX: 29.16 KG/M2 | SYSTOLIC BLOOD PRESSURE: 110 MMHG | HEIGHT: 73 IN

## 2020-12-29 DIAGNOSIS — R07.89 CHEST PAIN, ATYPICAL: ICD-10-CM

## 2020-12-29 LAB
ASCENDING AORTA: 3 CM
BH CV ECHO MEAS - ACS: 2.4 CM
BH CV ECHO MEAS - AO MAX PG: 7.2 MMHG
BH CV ECHO MEAS - AO ROOT AREA (BSA CORRECTED): 1.4
BH CV ECHO MEAS - AO ROOT AREA: 8.3 CM^2
BH CV ECHO MEAS - AO ROOT DIAM: 3.3 CM
BH CV ECHO MEAS - AO V2 MAX: 134.4 CM/SEC
BH CV ECHO MEAS - ASC AORTA: 3 CM
BH CV ECHO MEAS - BSA(HAYCOCK): 2.3 M^2
BH CV ECHO MEAS - BSA: 2.3 M^2
BH CV ECHO MEAS - BZI_BMI: 28.2 KILOGRAMS/M^2
BH CV ECHO MEAS - BZI_METRIC_HEIGHT: 188 CM
BH CV ECHO MEAS - BZI_METRIC_WEIGHT: 99.8 KG
BH CV ECHO MEAS - EDV(MOD-SP4): 161 ML
BH CV ECHO MEAS - EDV(TEICH): 114.4 ML
BH CV ECHO MEAS - EF(CUBED): 66.4 %
BH CV ECHO MEAS - EF(MOD-BP): 59 %
BH CV ECHO MEAS - EF(MOD-SP4): 58.4 %
BH CV ECHO MEAS - EF(TEICH): 57.7 %
BH CV ECHO MEAS - ESV(MOD-SP4): 67 ML
BH CV ECHO MEAS - ESV(TEICH): 48.4 ML
BH CV ECHO MEAS - FS: 30.5 %
BH CV ECHO MEAS - IVS/LVPW: 0.85
BH CV ECHO MEAS - IVSD: 0.92 CM
BH CV ECHO MEAS - LAT PEAK E' VEL: 17 CM/SEC
BH CV ECHO MEAS - LV DIASTOLIC VOL/BSA (35-75): 71.1 ML/M^2
BH CV ECHO MEAS - LV MASS(C)D: 178.8 GRAMS
BH CV ECHO MEAS - LV MASS(C)DI: 79 GRAMS/M^2
BH CV ECHO MEAS - LV SYSTOLIC VOL/BSA (12-30): 29.6 ML/M^2
BH CV ECHO MEAS - LVIDD: 4.9 CM
BH CV ECHO MEAS - LVIDS: 3.4 CM
BH CV ECHO MEAS - LVLD AP4: 9.5 CM
BH CV ECHO MEAS - LVLS AP4: 7.8 CM
BH CV ECHO MEAS - LVPWD: 1.1 CM
BH CV ECHO MEAS - MED PEAK E' VEL: 10.6 CM/SEC
BH CV ECHO MEAS - MR MAX PG: 36.6 MMHG
BH CV ECHO MEAS - MR MAX VEL: 302.7 CM/SEC
BH CV ECHO MEAS - MV A DUR: 0.13 SEC
BH CV ECHO MEAS - MV A MAX VEL: 58.3 CM/SEC
BH CV ECHO MEAS - MV DEC SLOPE: 234 CM/SEC^2
BH CV ECHO MEAS - MV DEC TIME: 0.22 SEC
BH CV ECHO MEAS - MV E MAX VEL: 76.3 CM/SEC
BH CV ECHO MEAS - MV E/A: 1.3
BH CV ECHO MEAS - MV P1/2T MAX VEL: 81.4 CM/SEC
BH CV ECHO MEAS - MV P1/2T: 101.9 MSEC
BH CV ECHO MEAS - MVA P1/2T LCG: 2.7 CM^2
BH CV ECHO MEAS - MVA(P1/2T): 2.2 CM^2
BH CV ECHO MEAS - PULM A REVS DUR: 0.1 SEC
BH CV ECHO MEAS - PULM A REVS VEL: 26.2 CM/SEC
BH CV ECHO MEAS - PULM DIAS VEL: 45.9 CM/SEC
BH CV ECHO MEAS - PULM S/D: 1.2
BH CV ECHO MEAS - PULM SYS VEL: 54.6 CM/SEC
BH CV ECHO MEAS - SI(CUBED): 35.1 ML/M^2
BH CV ECHO MEAS - SI(MOD-SP4): 41.5 ML/M^2
BH CV ECHO MEAS - SI(TEICH): 29.2 ML/M^2
BH CV ECHO MEAS - SV(CUBED): 79.5 ML
BH CV ECHO MEAS - SV(MOD-SP4): 94 ML
BH CV ECHO MEAS - SV(TEICH): 66 ML
BH CV ECHO MEAS - TAPSE (>1.6): 2.2 CM
BH CV ECHO MEAS - TR MAX VEL: 234.8 CM/SEC
BH CV ECHO MEASUREMENTS AVERAGE E/E' RATIO: 5.53
BH CV STRESS BP STAGE 1: NORMAL
BH CV STRESS BP STAGE 2: NORMAL
BH CV STRESS BP STAGE 3: NORMAL
BH CV STRESS DURATION MIN STAGE 1: 3
BH CV STRESS DURATION MIN STAGE 2: 3
BH CV STRESS DURATION MIN STAGE 3: 3
BH CV STRESS DURATION MIN STAGE 4: 1
BH CV STRESS DURATION SEC STAGE 1: 0
BH CV STRESS DURATION SEC STAGE 2: 0
BH CV STRESS DURATION SEC STAGE 3: 0
BH CV STRESS DURATION SEC STAGE 4: 0
BH CV STRESS ECHO POST STRESS EJECTION FRACTION EF: 76 %
BH CV STRESS GRADE STAGE 1: 10
BH CV STRESS GRADE STAGE 2: 12
BH CV STRESS GRADE STAGE 3: 14
BH CV STRESS GRADE STAGE 4: 16
BH CV STRESS HR STAGE 1: 104
BH CV STRESS HR STAGE 2: 124
BH CV STRESS HR STAGE 3: 152
BH CV STRESS HR STAGE 4: 167
BH CV STRESS METS STAGE 1: 5
BH CV STRESS METS STAGE 2: 7.5
BH CV STRESS METS STAGE 3: 10
BH CV STRESS METS STAGE 4: 13.5
BH CV STRESS PROTOCOL 1: NORMAL
BH CV STRESS SPEED STAGE 1: 1.7
BH CV STRESS SPEED STAGE 2: 2.5
BH CV STRESS SPEED STAGE 3: 3.4
BH CV STRESS SPEED STAGE 4: 4.2
BH CV STRESS STAGE 1: 1
BH CV STRESS STAGE 2: 2
BH CV STRESS STAGE 3: 3
BH CV STRESS STAGE 4: 4
BH CV XLRA - RV BASE: 3.1 CM
BH CV XLRA - RV LENGTH: 8.1 CM
BH CV XLRA - RV MID: 2.6 CM
BH CV XLRA - TDI S': 15.9 CM/SEC
LEFT ATRIUM VOLUME INDEX: 14 ML/M2
MAXIMAL PREDICTED HEART RATE: 179 BPM
PERCENT MAX PREDICTED HR: 93.3 %
SARS-COV-2 AB SERPL QL IA: NEGATIVE
SINUS: 3.1 CM
STJ: 3 CM
STRESS BASELINE BP: NORMAL MMHG
STRESS BASELINE HR: 82 BPM
STRESS O2 SAT REST: 98 %
STRESS PERCENT HR: 110 %
STRESS POST ESTIMATED WORKLOAD: 11 METS
STRESS POST EXERCISE DUR MIN: 10 MIN
STRESS POST EXERCISE DUR SEC: 0 SEC
STRESS POST PEAK BP: NORMAL MMHG
STRESS POST PEAK HR: 167 BPM
STRESS TARGET HR: 152 BPM

## 2020-12-29 PROCEDURE — 93320 DOPPLER ECHO COMPLETE: CPT | Performed by: INTERNAL MEDICINE

## 2020-12-29 PROCEDURE — 93016 CV STRESS TEST SUPVJ ONLY: CPT | Performed by: INTERNAL MEDICINE

## 2020-12-29 PROCEDURE — 93350 STRESS TTE ONLY: CPT

## 2020-12-29 PROCEDURE — 93352 ADMIN ECG CONTRAST AGENT: CPT | Performed by: INTERNAL MEDICINE

## 2020-12-29 PROCEDURE — 93325 DOPPLER ECHO COLOR FLOW MAPG: CPT | Performed by: INTERNAL MEDICINE

## 2020-12-29 PROCEDURE — 93350 STRESS TTE ONLY: CPT | Performed by: INTERNAL MEDICINE

## 2020-12-29 PROCEDURE — 25010000002 PERFLUTREN (DEFINITY) 8.476 MG IN SODIUM CHLORIDE 0.9 % 10 ML INJECTION: Performed by: NURSE PRACTITIONER

## 2020-12-29 PROCEDURE — 93017 CV STRESS TEST TRACING ONLY: CPT

## 2020-12-29 PROCEDURE — 93320 DOPPLER ECHO COMPLETE: CPT

## 2020-12-29 PROCEDURE — 93018 CV STRESS TEST I&R ONLY: CPT | Performed by: INTERNAL MEDICINE

## 2020-12-29 PROCEDURE — 93325 DOPPLER ECHO COLOR FLOW MAPG: CPT

## 2020-12-29 RX ADMIN — PERFLUTREN 3 ML: 6.52 INJECTION, SUSPENSION INTRAVENOUS at 15:30

## 2020-12-30 ENCOUNTER — TELEPHONE (OUTPATIENT)
Dept: CARDIOLOGY | Facility: CLINIC | Age: 40
End: 2020-12-30

## 2020-12-30 NOTE — TELEPHONE ENCOUNTER
Called and spoke with patient regarding my chart questions.  I have asked him to start weaning off ibuprofen after 14 days.  Is been approximately 9 days.  He verbalized understanding.  Discussed normal stress echo.  For peace of mind, he stated he would like to proceed with previously scheduled cardiac MRI.  He also has an upcoming allergy appointment.  He has been using his inhaler a little more frequently.  He does note mild improvement of his symptoms.  He also has an upcoming GI appointment.

## 2020-12-30 NOTE — TELEPHONE ENCOUNTER
Called patient. No answer. LVM of Stress echo being negative. He is to call me w/ questions or concerns.          AL

## 2020-12-31 ENCOUNTER — HOSPITAL ENCOUNTER (OUTPATIENT)
Dept: MRI IMAGING | Facility: HOSPITAL | Age: 40
Discharge: HOME | End: 2020-12-31
Admitting: NURSE PRACTITIONER

## 2020-12-31 LAB — CREAT BLDA-MCNC: 1.3 MG/DL (ref 0.6–1.3)

## 2020-12-31 PROCEDURE — A9577 INJ MULTIHANCE: HCPCS | Performed by: NURSE PRACTITIONER

## 2020-12-31 PROCEDURE — 75561 CARDIAC MRI FOR MORPH W/DYE: CPT | Performed by: INTERNAL MEDICINE

## 2020-12-31 PROCEDURE — 82565 ASSAY OF CREATININE: CPT

## 2020-12-31 PROCEDURE — 75561 CARDIAC MRI FOR MORPH W/DYE: CPT

## 2020-12-31 PROCEDURE — 0 GADOBENATE DIMEGLUMINE 529 MG/ML SOLUTION: Performed by: NURSE PRACTITIONER

## 2020-12-31 RX ADMIN — GADOBENATE DIMEGLUMINE 20 ML: 529 INJECTION, SOLUTION INTRAVENOUS at 10:12

## 2021-01-04 ENCOUNTER — TELEPHONE (OUTPATIENT)
Dept: CARDIOLOGY | Facility: CLINIC | Age: 41
End: 2021-01-04

## 2021-01-04 NOTE — TELEPHONE ENCOUNTER
LVM of unremarkable cardiac MRI. He is to call with any questions or concerns.   
SCD for DVT ppx no chemical AC due to SAH and SDH.

## 2021-01-05 ENCOUNTER — TELEPHONE (OUTPATIENT)
Dept: CARDIOLOGY | Facility: CLINIC | Age: 41
End: 2021-01-05

## 2021-01-05 NOTE — TELEPHONE ENCOUNTER
Called spoke with patient. Reviewed normal MRI. He has since tested positive for COVID. He is now quarantined. He inquired if he can go back to exercising once his quarantine is over.  I cleared him for that.  He is to let me know if he has any questions or concerns once he gets over Covid.  He verbalized understanding

## 2021-01-05 NOTE — TELEPHONE ENCOUNTER
----- Message from Janae Gonzalez MA sent at 1/5/2021  3:41 PM EST -----  Regarding: FW: Test Results Question  Contact: [phone]    ----- Message -----  From: Hiram Ho  Sent: 1/5/2021   1:08 PM EST  To: U[facility]  Subject: Test Results Question                            Echo Funes I missed your call yesterday. Would you give me a call back at [phone] when you get chance?    Thanks,  Hiram Ho

## 2021-02-04 ENCOUNTER — OFFICE VISIT (OUTPATIENT)
Dept: GASTROENTEROLOGY | Facility: CLINIC | Age: 41
End: 2021-02-04

## 2021-02-04 VITALS — TEMPERATURE: 97.3 F | BODY MASS INDEX: 29.16 KG/M2 | HEIGHT: 73 IN | WEIGHT: 220 LBS

## 2021-02-04 DIAGNOSIS — Z80.0 FAMILY HISTORY OF COLON CANCER: ICD-10-CM

## 2021-02-04 DIAGNOSIS — R07.89 ATYPICAL CHEST PAIN: Primary | ICD-10-CM

## 2021-02-04 DIAGNOSIS — Z12.11 SCREENING FOR COLON CANCER: ICD-10-CM

## 2021-02-04 DIAGNOSIS — R10.13 DYSPEPSIA: ICD-10-CM

## 2021-02-04 PROCEDURE — 99214 OFFICE O/P EST MOD 30 MIN: CPT | Performed by: NURSE PRACTITIONER

## 2021-02-04 RX ORDER — PANTOPRAZOLE SODIUM 40 MG/1
40 TABLET, DELAYED RELEASE ORAL DAILY
Qty: 30 TABLET | Refills: 5 | Status: SHIPPED | OUTPATIENT
Start: 2021-02-04 | End: 2021-05-14

## 2021-02-04 NOTE — PROGRESS NOTES
Chief Complaint   Patient presents with   • Chest Pain     HPI    Hiram Ho is a  40 y.o. male here to establish care as a new patient for complaints of chest pain and dyspepsia.  He has had a thorough cardiac work-up which was normal.  He was instructed to see a gastroenterologist after his recent ER evaluation. Recent cardiac MRI normal.  Recent CT angio of the chest normal.    Patient had Covid just after [place].  Recent normal CBC and CMP.    On visit today he complains of epigastric discomfort described as a pressure/aching sensation that comes and goes that radiates into his chest.  He has had improvement in symptoms with as needed Tums and Pepcid.  He took OTC Pepcid for approximately 4 weeks which seemed to help but then he stopped.  He denies nausea, vomiting, dysphagia, or odynophagia.    He reports some change in bowel pattern since having Covid.  Has been more prone to loose stools lately.  He has 2 second-degree family members with histories of colon cancer in both of his parents have personal histories of colon polyps.  He denies rectal bleeding.    He does not smoke.  He rarely drinks alcohol.  He does not use NSAIDs.  He still has his gallbladder.    Past Medical History:   Diagnosis Date   • Allergic    • Asian flu type A    • Asthma    • Earache    • Environmental and seasonal allergies        Past Surgical History:   Procedure Laterality Date   • COLONOSCOPY N/A 2/13/2017    Procedure: DR Soto; COLONOSCOPY TO CECUM;  Surgeon: Stephane Soto MD;  Location: [facility] ENDOSCOPY;  Service:    • DENTAL PROCEDURE     • MANDIBLE FRACTURE SURGERY         Scheduled Meds:  Outpatient Encounter Medications as of 2/4/2021   Medication Sig Dispense Refill   • BREO ELLIPTA 200-25 MCG/INH inhaler 1 puff Daily.     • cetirizine (ZyrTEC) 10 MG tablet Take 10 mg by mouth daily.     • fluticasone (FLONASE) 50 MCG/ACT nasal spray 2 sprays into the nostril(s) as directed by provider Daily. 16 g 0   •  montelukast (SINGULAIR) 10 MG tablet Take  by mouth Daily.  12   • PROAIR RESPICLICK 108 (90 Base) MCG/ACT inhaler 1-2 inhalations every 6-8 hours as needed 1 inhaler 0   • ibuprofen (ADVIL,MOTRIN) 200 MG tablet Take 200 mg by mouth Every 8 (Eight) Hours As Needed for Mild Pain .     • pantoprazole (PROTONIX) 40 MG EC tablet Take 1 tablet by mouth Daily. 30 tablet 5   • [DISCONTINUED] azithromycin (ZITHROMAX) 500 MG tablet Take  1 tablet daily for 5 days. 5 tablet 0   • [DISCONTINUED] famotidine (PEPCID) 20 MG tablet Take 20 mg by mouth Daily.     • [DISCONTINUED] methylPREDNISolone (Medrol) 4 MG dose pack follow package directions 1 each 0     No facility-administered encounter medications on file as of 2021.        Continuous Infusions:No current facility-administered medications for this visit.       PRN Meds:.    No Known Allergies    Social History     Socioeconomic History   • Marital status:      Spouse name: Not on file   • Number of children: 4   • Years of education: Not on file   • Highest education level: Not on file   Occupational History   • Occupation: Teacher   Tobacco Use   • Smoking status: Never Smoker   • Smokeless tobacco: Never Used   Substance and Sexual Activity   • Alcohol use: Yes     Comment: RARELY   • Drug use: No   • Sexual activity: Yes     Partners: Female     Comment: 4 children   Social History Narrative    -Business- [employer]       Family History   Problem Relation Age of Onset   • Diabetes Mother         Dialysis;  2020   • Cancer Mother         Kidney   • Heart disease Mother         CHF?   • Kidney cancer Mother    • Colon cancer Paternal Grandmother    • Cancer Paternal Grandmother 39        Colon   • No Known Problems Father    • No Known Problems Sister    • No Known Problems Brother        Review of Systems   Constitutional: Negative for activity change, appetite change, fatigue, fever and unexpected weight change.   HENT:  Negative for trouble swallowing.    Eyes: Negative.    Respiratory: Negative for apnea, cough, choking, chest tightness, shortness of breath and wheezing.    Cardiovascular: Positive for chest pain. Negative for palpitations and leg swelling.   Gastrointestinal: Positive for abdominal pain. Negative for abdominal distention, anal bleeding, blood in stool, constipation, diarrhea, nausea, rectal pain and vomiting.   Endocrine: Negative.    Genitourinary: Negative.    Musculoskeletal: Negative.    Skin: Negative.    Allergic/Immunologic: Negative.    Neurological: Negative.    Hematological: Negative.    Psychiatric/Behavioral: Negative.        Vitals:    02/04/21 1432   Temp: 97.3 °F (36.3 °C)       Physical Exam  Constitutional:       Appearance: He is well-developed.   HENT:      Head: Normocephalic.      Nose: Nose normal.   Eyes:      Pupils: Pupils are equal, round, and reactive to light.   Neck:      Musculoskeletal: Normal range of motion.   Cardiovascular:      Rate and Rhythm: Normal rate and regular rhythm.      Heart sounds: Normal heart sounds.   Pulmonary:      Effort: Pulmonary effort is normal. No respiratory distress.      Breath sounds: Normal breath sounds. No wheezing.   Abdominal:      General: Bowel sounds are normal. There is no distension.      Palpations: Abdomen is soft. There is no mass.      Tenderness: There is no abdominal tenderness. There is no guarding.      Hernia: No hernia is present.   Musculoskeletal: Normal range of motion.   Skin:     General: Skin is warm and dry.      Capillary Refill: Capillary refill takes less than 2 seconds.   Neurological:      Mental Status: He is alert and oriented to person, place, and time.   Psychiatric:         Behavior: Behavior normal.       No radiology results for the last 7 days    Diagnoses and all orders for this visit:    1. Atypical chest pain (Primary)  -     Case Request; Standing  -     Obtain Informed Consent; Standing  -     Verify  Bowel Prep Was Successful; Standing  -     Give Tap Water Enema If Bowel Prep Insufficient; Standing  -     Case Request    2. Dyspepsia  -     Case Request; Standing  -     Obtain Informed Consent; Standing  -     Verify Bowel Prep Was Successful; Standing  -     Give Tap Water Enema If Bowel Prep Insufficient; Standing  -     Case Request    3. Family history of colon cancer  -     Case Request; Standing  -     Obtain Informed Consent; Standing  -     Verify Bowel Prep Was Successful; Standing  -     Give Tap Water Enema If Bowel Prep Insufficient; Standing  -     Case Request    4. Screening for colon cancer  -     Case Request; Standing  -     Obtain Informed Consent; Standing  -     Verify Bowel Prep Was Successful; Standing  -     Give Tap Water Enema If Bowel Prep Insufficient; Standing  -     Case Request    Other orders  -     pantoprazole (PROTONIX) 40 MG EC tablet; Take 1 tablet by mouth Daily.  Dispense: 30 tablet; Refill: 5    Assessment/plan    Pleasant 40-year-old male seen today to establish care as a new patient for atypical chest pain, dyspepsia, with normal cardiac work-up.  He also has a strong family history of colon polyps and colon cancer.  At this point recommend bidirectional endoscopic evaluation with Dr. Mancilla.  Start Protonix 40 mg p.o. daily.  We reviewed GERD diet and lifestyle modifications.  Educational handout provided to the patient.  If EGD found to be unremarkable consider gallbladder work-up.  Further recommendations and follow-up pending the aforementioned work-up.

## 2021-03-12 ENCOUNTER — LAB REQUISITION (OUTPATIENT)
Dept: LAB | Facility: HOSPITAL | Age: 41
End: 2021-03-12

## 2021-03-12 DIAGNOSIS — Z00.00 ENCOUNTER FOR GENERAL ADULT MEDICAL EXAMINATION WITHOUT ABNORMAL FINDINGS: ICD-10-CM

## 2021-03-12 PROCEDURE — U0004 COV-19 TEST NON-CDC HGH THRU: HCPCS | Performed by: INTERNAL MEDICINE

## 2021-03-13 LAB — SARS-COV-2 ORF1AB RESP QL NAA+PROBE: NOT DETECTED

## 2021-03-18 ENCOUNTER — OUTSIDE FACILITY SERVICE (OUTPATIENT)
Dept: GASTROENTEROLOGY | Facility: CLINIC | Age: 41
End: 2021-03-18

## 2021-03-18 PROCEDURE — 43239 EGD BIOPSY SINGLE/MULTIPLE: CPT | Performed by: INTERNAL MEDICINE

## 2021-03-18 PROCEDURE — 45378 DIAGNOSTIC COLONOSCOPY: CPT | Performed by: INTERNAL MEDICINE

## 2021-04-01 ENCOUNTER — TELEPHONE (OUTPATIENT)
Dept: GASTROENTEROLOGY | Facility: CLINIC | Age: 41
End: 2021-04-01

## 2021-04-01 NOTE — TELEPHONE ENCOUNTER
----- Message from Jaren Mancilla MD sent at 3/25/2021 12:13 PM EDT -----  Pathology benignColonoscopy recall 5 years

## 2021-05-11 ENCOUNTER — TELEPHONE (OUTPATIENT)
Dept: GASTROENTEROLOGY | Facility: CLINIC | Age: 41
End: 2021-05-11

## 2021-05-11 NOTE — TELEPHONE ENCOUNTER
----- Message from Barbara Robledo sent at 5/11/2021 12:52 PM EDT -----  Regarding: Med needs PA  Contact: [phone]  Pt [facility] med needs PA per pharmacy      pantoprazole (PROTONIX) 40 MG EC tablet         [id number] - [place], [place] - [address] AT [facility] [address] - [phone]  - [phone]    [address], [facility] [address]   Phone:  [phone]  Fax:  [phone]

## 2021-05-12 ENCOUNTER — TELEPHONE (OUTPATIENT)
Dept: GASTROENTEROLOGY | Facility: CLINIC | Age: 41
End: 2021-05-12

## 2021-05-14 RX ORDER — PANTOPRAZOLE SODIUM 40 MG/1
40 TABLET, DELAYED RELEASE ORAL DAILY
Qty: 90 TABLET | Refills: 3 | Status: SHIPPED | OUTPATIENT
Start: 2021-05-14 | End: 2022-05-10

## 2021-10-08 ENCOUNTER — OFFICE VISIT (OUTPATIENT)
Dept: INTERNAL MEDICINE | Age: 41
End: 2021-10-08

## 2021-10-08 VITALS
HEART RATE: 76 BPM | HEIGHT: 73 IN | SYSTOLIC BLOOD PRESSURE: 110 MMHG | DIASTOLIC BLOOD PRESSURE: 70 MMHG | BODY MASS INDEX: 29.93 KG/M2 | OXYGEN SATURATION: 99 % | TEMPERATURE: 98.2 F | WEIGHT: 225.8 LBS

## 2021-10-08 DIAGNOSIS — H65.91 FLUID LEVEL BEHIND TYMPANIC MEMBRANE OF RIGHT EAR: Primary | ICD-10-CM

## 2021-10-08 PROCEDURE — 99213 OFFICE O/P EST LOW 20 MIN: CPT | Performed by: NURSE PRACTITIONER

## 2021-10-08 RX ORDER — METHYLPREDNISOLONE 4 MG/1
TABLET ORAL
Qty: 1 EACH | Refills: 0 | Status: SHIPPED | OUTPATIENT
Start: 2021-10-08 | End: 2022-01-03

## 2021-10-08 NOTE — PROGRESS NOTES
"    I N T E R N A L  M E D I C I N E  CALIN ORTEGA, APRN      ENCOUNTER DATE:  10/08/2021    Hiram Ho / 41 y.o. / male      CHIEF COMPLAINT / REASON FOR OFFICE VISIT     Earache (R ear x2 months, hearing affected)      ASSESSMENT & PLAN     1. Fluid level behind tympanic membrane of right ear  -Sudafed  -Afrin maximum of 2 to 3 days  -Continue Flonase  -Medrol pack    No orders of the defined types were placed in this encounter.    New Medications Ordered This Visit   Medications   • methylPREDNISolone (MEDROL) 4 MG dose pack     Sig: Take as directed on package instructions.     Dispense:  1 each     Refill:  0       SUMMARY/DISCUSSION  • Follow-up with ENT if no symptom improvement    Next Appointment with me: 10/12/2021    Return if symptoms worsen or fail to improve.      VITAL SIGNS     Visit Vitals  /70 (Cuff Size: Adult)   Pulse 76   Temp 98.2 °F (36.8 °C) (Temporal)   Ht 185.4 cm (73\")   Wt 102 kg (225 lb 12.8 oz)   SpO2 99%   BMI 29.79 kg/m²     Wt Readings from Last 3 Encounters:   10/08/21 102 kg (225 lb 12.8 oz)   02/04/21 99.8 kg (220 lb)   01/22/21 99.8 kg (220 lb)     Body mass index is 29.79 kg/m².      MEDICATIONS AT THE TIME OF OFFICE VISIT     Current Outpatient Medications on File Prior to Visit   Medication Sig   • BREO ELLIPTA 200-25 MCG/INH inhaler 1 puff Daily.   • cetirizine (ZyrTEC) 10 MG tablet Take 10 mg by mouth daily.   • fluticasone (FLONASE) 50 MCG/ACT nasal spray 2 sprays into the nostril(s) as directed by provider Daily.   • ibuprofen (ADVIL,MOTRIN) 200 MG tablet Take 200 mg by mouth Every 8 (Eight) Hours As Needed for Mild Pain .   • montelukast (SINGULAIR) 10 MG tablet Take  by mouth Daily.   • pantoprazole (PROTONIX) 40 MG EC tablet TAKE 1 TABLET BY MOUTH DAILY   • PROAIR RESPICLICK 108 (90 Base) MCG/ACT inhaler 1-2 inhalations every 6-8 hours as needed     No current facility-administered medications on file prior to visit.         HISTORY OF PRESENT ILLNESS     Patient " presents with approximately 2 months of right ear discomfort along with minimal hearing loss.  He has been treated for similar issues in the past by ENT for retracted tympanic membrane due to fluid and sinus congestion.  He is continued Flonase along with Zyrtec daily.  No ear drainage or tenderness.     REVIEW OF SYSTEMS     Constitutional neg except per HPI   ENT right ear minimal hearing loss and comfort  Resp neg  CV neg    PHYSICAL EXAMINATION     Physical Exam  Constitutional  No distress  ENT right ear middle ear effusion no signs of infection  Cardiovascular Rate  normal . Rhythm: regular . Heart sounds:  normal  Pulmonary/Chest  Effort normal. Breath sounds:  normal  Psychiatric  Alert. Judgment and thought content normal. Mood normal     REVIEWED DATA     Labs:         Imaging:           Medical Tests:             Summary of old records / correspondence / consultant report:           Request outside records:           *Examiner was wearing medical surgical mask, face shield and exam gloves during the entire duration of the visit. Patient was masked the entire time.   Minimum social distance of 6 ft maintained entire visit except if physical contact was necessary as documented.     **Dragon Disclaimer:   Much of this encounter note is an electronic transcription/translation of spoken language to printed text. The electronic translation of spoken language may permit erroneous, or at times, nonsensical words or phrases to be inadvertently transcribed. Although I have reviewed the note for such errors, some may still exist.

## 2021-10-12 ENCOUNTER — OFFICE VISIT (OUTPATIENT)
Dept: INTERNAL MEDICINE | Age: 41
End: 2021-10-12

## 2021-10-12 ENCOUNTER — HOSPITAL ENCOUNTER (OUTPATIENT)
Dept: GENERAL RADIOLOGY | Facility: HOSPITAL | Age: 41
Discharge: HOME | End: 2021-10-12
Admitting: NURSE PRACTITIONER

## 2021-10-12 VITALS
TEMPERATURE: 97.5 F | OXYGEN SATURATION: 99 % | SYSTOLIC BLOOD PRESSURE: 114 MMHG | HEIGHT: 73 IN | HEART RATE: 62 BPM | DIASTOLIC BLOOD PRESSURE: 70 MMHG | BODY MASS INDEX: 30.19 KG/M2 | WEIGHT: 227.8 LBS

## 2021-10-12 DIAGNOSIS — M62.89 MUSCLE TIGHTNESS: ICD-10-CM
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
## 2021-10-12 DIAGNOSIS — M54.2 CERVICAL PAIN: ICD-10-CM

## 2021-10-12 PROCEDURE — 99213 OFFICE O/P EST LOW 20 MIN: CPT | Performed by: NURSE PRACTITIONER

## 2021-10-12 PROCEDURE — 72040 X-RAY EXAM NECK SPINE 2-3 VW: CPT

## 2021-10-12 RX ORDER — CYCLOBENZAPRINE HCL 10 MG
10 TABLET ORAL 3 TIMES DAILY PRN
Qty: 21 TABLET | Refills: 0 | Status: SHIPPED | OUTPATIENT
Start: 2021-10-12 | End: 2022-10-17
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
## 2021-10-12 NOTE — PATIENT INSTRUCTIONS
1) Flexeril (muscle relaxer) as needed   2) Naproxen (Aleve) 220-440 mg twice daily for 1-2 weeks

## 2022-05-10 RX ORDER — PANTOPRAZOLE SODIUM 40 MG/1
40 TABLET, DELAYED RELEASE ORAL DAILY
Qty: 90 TABLET | Refills: 0 | Status: SHIPPED | OUTPATIENT
Start: 2022-05-10 | End: 2022-08-15 | Stop reason: SDUPTHER

## 2022-08-11 RX ORDER — PANTOPRAZOLE SODIUM 40 MG/1
40 TABLET, DELAYED RELEASE ORAL DAILY
Qty: 90 TABLET | Refills: 0 | OUTPATIENT
Start: 2022-08-11

## 2022-08-15 NOTE — TELEPHONE ENCOUNTER
Caller: Hiram Ho BERTRAM    Relationship: Self    Best call back number: [phone]    Requested Prescriptions:   Requested Prescriptions     Pending Prescriptions Disp Refills   • pantoprazole (PROTONIX) 40 MG EC tablet 90 tablet 0     Sig: Take 1 tablet by mouth Daily.        Pharmacy where request should be sent[facility]     Additional details provided by patient:     Does the patient have less than a 3 day supply:  [x] Yes  [] No    Barbara Peterson Rep   08/15/22 15:09 EDT

## 2022-08-16 RX ORDER — PANTOPRAZOLE SODIUM 40 MG/1
40 TABLET, DELAYED RELEASE ORAL DAILY
Qty: 90 TABLET | Refills: 0 | Status: SHIPPED | OUTPATIENT
Start: 2022-08-16 | End: 2022-11-14 | Stop reason: SDUPTHER

## 2022-11-14 ENCOUNTER — TELEPHONE (OUTPATIENT)
Dept: GASTROENTEROLOGY | Facility: CLINIC | Age: 42
End: 2022-11-14

## 2022-11-14 RX ORDER — PANTOPRAZOLE SODIUM 40 MG/1
40 TABLET, DELAYED RELEASE ORAL DAILY
Qty: 90 TABLET | Refills: 0 | Status: SHIPPED | OUTPATIENT
Start: 2022-11-14 | End: 2023-02-07

## 2022-11-14 NOTE — TELEPHONE ENCOUNTER
Called pt and advised will send a refill to his [facility] pharmacy.     Refilled x1, sent to JEREMIE Hernandes to cosign.     Pt has f/u appt with JEREMIE Hernandes 12/12/22 @2pm.

## 2022-11-14 NOTE — TELEPHONE ENCOUNTER
Hub staff attempted to follow warm transfer process and was unsuccessful     Caller: Hiram Ho    Relationship to patient: Self    Best call back number: [phone]    Patient is needing: PATIENT IS CALLING FOR UPDATE ON REFILL REQUEST. WOULD LIKE TO SPEAK WITH SOMEONE IN OFFICE REGARDING THIS.

## 2022-12-12 ENCOUNTER — OFFICE VISIT (OUTPATIENT)
Dept: GASTROENTEROLOGY | Facility: CLINIC | Age: 42
End: 2022-12-12

## 2022-12-12 VITALS
BODY MASS INDEX: 29.95 KG/M2 | HEART RATE: 66 BPM | HEIGHT: 74 IN | TEMPERATURE: 97.5 F | DIASTOLIC BLOOD PRESSURE: 76 MMHG | WEIGHT: 233.4 LBS | OXYGEN SATURATION: 94 % | SYSTOLIC BLOOD PRESSURE: 116 MMHG

## 2022-12-12 DIAGNOSIS — K21.9 GASTROESOPHAGEAL REFLUX DISEASE, UNSPECIFIED WHETHER ESOPHAGITIS PRESENT: Primary | ICD-10-CM

## 2022-12-12 DIAGNOSIS — Z80.0 FAMILY HISTORY OF COLON CANCER: ICD-10-CM

## 2022-12-12 PROCEDURE — 99213 OFFICE O/P EST LOW 20 MIN: CPT | Performed by: NURSE PRACTITIONER

## 2022-12-12 NOTE — PROGRESS NOTES
Chief Complaint   Patient presents with   • Heartburn       HPI    Hiram Ho is a  42 y.o. male here for a follow up visit for GERD.    This patient follows with Dr. Mancilla and myself.    Patient reports complete resolution of epigastric discomfort and chest pain following EGD with initiation of PPI therapy.  Has been maintained on daily Protonix to good effect.  He is also made major dietary changes and is basically following a strict antireflux diet.  He has not tried decreasing PPI therapy as of yet.  He currently denies nausea, vomiting, dysphagia, odynophagia.  His appetite is good.  His weight is stable.    No diarrhea, constipation, or rectal bleeding.    He has 2 second-degree family members with histories of colon cancer in both of his parents have personal histories of colon polyps.     Additional data reviewed:    C-scope 3/2021 w/ Normal ileum and nonbleeding internal hemorrhoids.  Recall 5 years.  EGD 3/2021 w/ normal findings.    Past Medical History:   Diagnosis Date   • Allergic    • Asian flu type A    • Asthma    • Earache    • Environmental and seasonal allergies        Past Surgical History:   Procedure Laterality Date   • COLONOSCOPY N/A 02/13/2017    Procedure: DR Soto; COLONOSCOPY TO CECUM;  Surgeon: Stephane Soto MD;  Location: [facility] ENDOSCOPY;  Service:    • DENTAL PROCEDURE     • MANDIBLE FRACTURE SURGERY     • UPPER GASTROINTESTINAL ENDOSCOPY  3/2021?       Scheduled Meds:     Continuous Infusions: No current facility-administered medications for this visit.      PRN Meds:     No Known Allergies    Social History     Socioeconomic History   • Marital status:    • Number of children: 4   Tobacco Use   • Smoking status: Never   • Smokeless tobacco: Never   Vaping Use   • Vaping Use: Never used   Substance and Sexual Activity   • Alcohol use: Yes     Comment: RARELY   • Drug use: No   • Sexual activity: Yes     Partners: Female     Comment: 4 children       Family History    Problem Relation Age of Onset   • Diabetes Mother         Dialysis;  2020   • Cancer Mother         Kidney   • Heart disease Mother         CHF?   • Kidney cancer Mother    • Colon cancer Paternal Grandmother    • Cancer Paternal Grandmother 39        Colon   • No Known Problems Father    • No Known Problems Sister    • No Known Problems Brother        Review of Systems   Constitutional: Negative for activity change, appetite change, fatigue, fever and unexpected weight change.   HENT: Negative for trouble swallowing.    Respiratory: Negative for apnea, cough, choking, chest tightness, shortness of breath and wheezing.    Cardiovascular: Negative for chest pain, palpitations and leg swelling.   Gastrointestinal: Negative for abdominal distention, abdominal pain, anal bleeding, blood in stool, constipation, diarrhea, nausea, rectal pain and vomiting.       Vitals:    22 1446   BP: 116/76   Pulse: 66   Temp: 97.5 °F (36.4 °C)   SpO2: 94%       Physical Exam  Constitutional:       Appearance: He is well-developed.   Abdominal:      General: Bowel sounds are normal. There is no distension.      Palpations: Abdomen is soft. There is no mass.      Tenderness: There is no abdominal tenderness. There is no guarding.      Hernia: No hernia is present.   Skin:     General: Skin is warm and dry.      Capillary Refill: Capillary refill takes less than 2 seconds.   Neurological:      Mental Status: He is alert and oriented to person, place, and time.   Psychiatric:         Behavior: Behavior normal.     Assessment    Diagnoses and all orders for this visit:    1. Gastroesophageal reflux disease, unspecified whether esophagitis present (Primary)    2. Family history of colon cancer       Plan    Stable GERD, continue PPI therapy.  Discuss decreasing dose to 20 mg a day which is comparable to over-the-counter after the holidays and patient is agreeable.  Continue antireflux diet.  Antireflux measures and dietary  modifications reviewed. Low acid diet reviewed. Keep head of bed elevated. Stop eating/drinking at least 3 hours prior to bedtime. Eliminate caffeine and carbonated beverages.  Weight loss encouraged if BMI over 25.  Colonoscopy for screening purposes 2026.  RTC 1 year or sooner if needed.          SUSAN Schwab  [facility]  [address] [place] [address]  Office: [phone]

## 2023-02-07 RX ORDER — PANTOPRAZOLE SODIUM 40 MG/1
40 TABLET, DELAYED RELEASE ORAL DAILY
Qty: 90 TABLET | Refills: 3 | Status: SHIPPED | OUTPATIENT
Start: 2023-02-07

## 2023-12-20 NOTE — THERAPY TREATMENT NOTE
Outpatient Physical Therapy Ortho Treatment Note   [place]     Patient Name: Hiram Ho  : 1980  MRN: [id number]  Today's Date: 2019      Visit Date: 2019    Visit Dx:    ICD-10-CM ICD-9-CM   1. Acute midline thoracic back pain M54.6 724.1       Patient Active Problem List   Diagnosis   • Routine health maintenance   • Family history of GI malignancy        Past Medical History:   Diagnosis Date   • Asian flu type A    • Asthma    • Earache    • Environmental and seasonal allergies         Past Surgical History:   Procedure Laterality Date   • COLONOSCOPY N/A 2017    Procedure: COLONOSCOPY TO CECUM;  Surgeon: Stephane Soto MD;  Location: [facility] ENDOSCOPY;  Service:    • DENTAL PROCEDURE     • MANDIBLE FRACTURE SURGERY                         PT Assessment/Plan     Row Name 19 1110          PT Assessment    Assessment Comments  Pt tolerated progression of prescribed exercises well.   -KM        PT Plan    PT Plan Comments  Continue per POC  -KM       User Key  (r) = Recorded By, (t) = Taken By, (c) = Cosigned By    Initials Name Provider Type    Arcelia Galvez PTA Physical Therapy Assistant          Modalities     Row Name 19 1110             Subjective Comments    Subjective Comments  Pt states he continues to feel good, states the strengthening exercises continue to be a challange but has noticed improvement with overall strength and tolerance.  -KM         Moist Heat    MH Applied  Yes  -KM      Location  thoracolumbar spine with MH with pt supine 90/90  -KM      Rx Minutes  15 mins  -KM      MH Prior to Rx  Yes  -KM         ELECTRICAL STIMULATION    Attended/Unattended  Unattended  -KM      Stimulation Type  IFC  -KM      Location/Electrode Placement/Other  thoracolumbar spine with MH and pt supine 90/90  -KM        User Key  (r) = Recorded By, (t) = Taken By, (c) = Cosigned By    Initials Name Provider Type    Arcelia Galvez PTA Physical Therapy Assistant         Exercises     Row Name 07/26/19 1110             Subjective Comments    Subjective Comments  Pt states he continues to feel good, states the strengthening exercises continue to be a challange but has noticed improvement with overall strength and tolerance.  -KM         Exercise 1    Exercise Name 1  Hamstring stretch bilateral  -KM      Cueing 1  Verbal;Tactile  -KM      Reps 1  10  -KM      Time 1  10 secs  -KM         Exercise 2    Exercise Name 2  SKTC stretch bilateral  -KM      Cueing 2  Verbal;Tactile  -KM      Reps 2  10  -KM      Time 2  10 secs  -KM         Exercise 3    Exercise Name 3  LTR stretch bilateral  -KM      Cueing 3  Verbal;Tactile  -KM      Reps 3  10  -KM      Time 3  10 secs  -KM         Exercise 4    Exercise Name 4  Sidelying rotational stretch bilateral  -KM      Cueing 4  Verbal;Tactile  -KM      Reps 4  10  -KM      Time 4  10 secs  -KM         Exercise 5    Exercise Name 5  Prayer stretch-bilateral  -KM      Cueing 5  Verbal;Tactile  -KM      Reps 5  10  -KM      Time 5  10 secs  -KM         Exercise 6    Exercise Name 6  Levator strtch-bilateral  -KM      Cueing 6  Verbal;Demo  -KM      Reps 6  10  -KM      Time 6  10 secs  -KM         Exercise 7    Exercise Name 7  Upper Trap stretch-bilateral  -KM      Cueing 7  Verbal;Demo  -KM      Reps 7  10   -KM      Time 7  10 secs  -KM         Exercise 8    Exercise Name 8  Prone chest raise  -KM      Cueing 8  Verbal;Tactile  -KM      Reps 8  25  -KM         Exercise 9    Exercise Name 9  Trunk rotation in standing vs theraband  -KM      Cueing 9  Verbal;Demo  -KM      Reps 9  25  -KM      Time 9  gold  -KM         Exercise 10    Exercise Name 10  Prone press up  -KM      Cueing 10  Verbal;Tactile  -KM      Reps 10  20  -KM         Exercise 11    Exercise Name 11  TB Rows  -KM      Reps 11  25  -KM      Time 11  Gold  -KM         Exercise 12    Exercise Name 12  TB Ext  -KM      Reps 12  25  -KM      Time 12  Gold  -KM         Exercise  13    Exercise Name 13  B. Prone Y/T  -KM      Reps 13  25x each  -KM      Time 13  3#  -KM        User Key  (r) = Recorded By, (t) = Taken By, (c) = Cosigned By    Initials Name Provider Type    Arcelia Galvez PTA Physical Therapy Assistant                                          Time Calculation:   Start Time: 1110  Stop Time: 1157  Time Calculation (min): 47 min  Therapy Charges for Today     Code Description Service Date Service Provider Modifiers Qty    [id number]  PT THER PROC EA 15 MIN 7/26/2019 Arcelia Johnson PTA GP 1                    Arcelia Johnson PTA  7/26/2019      symmetrical

## 2024-02-13 ENCOUNTER — TELEPHONE (OUTPATIENT)
Dept: GASTROENTEROLOGY | Facility: CLINIC | Age: 44
End: 2024-02-13
Payer: COMMERCIAL

## 2024-02-14 RX ORDER — PANTOPRAZOLE SODIUM 40 MG/1
40 TABLET, DELAYED RELEASE ORAL DAILY
Qty: 90 TABLET | Refills: 0 | Status: SHIPPED | OUTPATIENT
Start: 2024-02-14

## 2024-03-12 ENCOUNTER — OFFICE VISIT (OUTPATIENT)
Dept: GASTROENTEROLOGY | Facility: CLINIC | Age: 44
End: 2024-03-12
Payer: COMMERCIAL

## 2024-03-12 VITALS
DIASTOLIC BLOOD PRESSURE: 82 MMHG | OXYGEN SATURATION: 97 % | SYSTOLIC BLOOD PRESSURE: 127 MMHG | HEART RATE: 66 BPM | TEMPERATURE: 98.6 F | WEIGHT: 227 LBS | HEIGHT: 74 IN | BODY MASS INDEX: 29.13 KG/M2

## 2024-03-12 DIAGNOSIS — K21.9 GASTROESOPHAGEAL REFLUX DISEASE, UNSPECIFIED WHETHER ESOPHAGITIS PRESENT: ICD-10-CM

## 2024-03-12 DIAGNOSIS — Z80.0 FAMILY HISTORY OF COLON CANCER: ICD-10-CM

## 2024-03-12 DIAGNOSIS — R13.10 DYSPHAGIA, UNSPECIFIED TYPE: Primary | ICD-10-CM

## 2024-03-12 PROCEDURE — 99214 OFFICE O/P EST MOD 30 MIN: CPT | Performed by: NURSE PRACTITIONER

## 2024-03-12 RX ORDER — PANTOPRAZOLE SODIUM 40 MG/1
40 TABLET, DELAYED RELEASE ORAL DAILY
Qty: 90 TABLET | Refills: 3 | Status: SHIPPED | OUTPATIENT
Start: 2024-03-12

## 2024-03-12 NOTE — PROGRESS NOTES
Chief Complaint   Patient presents with    Heartburn       HPI    Hiram Ho is a  43 y.o. male here for a follow up visit for GERD.    This patient follows with Dr. Mancilla and myself.    Patient reports doing quite well on Protonix 40 mg once daily.  He feels like dyspeptic symptoms are well-managed.  Denies nausea or vomiting.  Appetite is good.  His weight is stable.  Passing he mentions episodes of dysphagia where he will feel food items lodged at the sternal notch.  This happens infrequently seems to be triggered by eating too quickly.  No issues with liquids but does cite solid foods especially dense meat or pills can be an issue.  In between episodes he feels fine.  Denies regurgitation.  Currently on weight watchers down 10 pounds.    He has 2 second-degree family members with histories of colon cancer in both of his parents have personal histories of colon polyps.      Additional data reviewed:     C-scope 3/2021 w/ Normal ileum and nonbleeding internal hemorrhoids.  Recall 5 years.    EGD 3/2021 w/ normal findings.     Past Medical History:   Diagnosis Date    Allergic     Asian flu type A     Asthma     Earache     Environmental and seasonal allergies        Past Surgical History:   Procedure Laterality Date    COLONOSCOPY N/A 02/13/2017    Procedure: DR Soto; COLONOSCOPY TO CECUM;  Surgeon: Stephane Soto MD;  Location: [facility] ENDOSCOPY;  Service:     DENTAL PROCEDURE      MANDIBLE FRACTURE SURGERY      UPPER GASTROINTESTINAL ENDOSCOPY  3/2021?       Scheduled Meds:     Continuous Infusions: No current facility-administered medications for this visit.      PRN Meds:     No Known Allergies    Social History     Socioeconomic History    Marital status:     Number of children: 4   Tobacco Use    Smoking status: Never    Smokeless tobacco: Never   Vaping Use    Vaping status: Never Used   Substance and Sexual Activity    Alcohol use: Yes     Comment: RARELY    Drug use: No    Sexual activity:  Yes     Partners: Female     Comment: 4 children       Family History   Problem Relation Age of Onset    Diabetes Mother         Dialysis;  2020    Cancer Mother         Kidney    Heart disease Mother         CHF?    Kidney cancer Mother     Colon cancer Paternal Grandmother     Cancer Paternal Grandmother 39        Colon    No Known Problems Father     No Known Problems Sister     No Known Problems Brother        Review of Systems   HENT:  Positive for trouble swallowing.    Gastrointestinal: Negative.        Vitals:    24 1322   BP: 127/82   Pulse: 66   Temp: 98.6 °F (37 °C)   SpO2: 97%       Physical Exam  Constitutional:       Appearance: He is well-developed.   Abdominal:      General: Bowel sounds are normal. There is no distension.      Palpations: Abdomen is soft. There is no mass.      Tenderness: There is no abdominal tenderness. There is no guarding.      Hernia: No hernia is present.   Skin:     General: Skin is warm and dry.      Capillary Refill: Capillary refill takes less than 2 seconds.   Neurological:      Mental Status: He is alert and oriented to person, place, and time.   Psychiatric:         Behavior: Behavior normal.     Assessment    Diagnoses and all orders for this visit:    1. Dysphagia, unspecified type (Primary)  -     FL ESOPHAGRAM SINGLE CONTRAST; Future    2. Gastroesophageal reflux disease, unspecified whether esophagitis present  -     FL ESOPHAGRAM SINGLE CONTRAST; Future    3. Family history of colon cancer    Other orders  -     pantoprazole (PROTONIX) 40 MG EC tablet; Take 1 tablet by mouth Daily.  Dispense: 90 tablet; Refill: 3       Plan    Very pleasant 43-year-old male seen today in follow-up for GERD in passing mentions episodes of dysphagia.  Discussed moving forward with esophagram for further evaluation of symptoms and patient is agreeable.  Continue PPI therapy in the interim refill provided.  Encouraged high-fiber diet.  He is up-to-date in terms of  colonoscopy for screening purposes reviewed prior workup with plans to repeat C-scope in 2026.  Further recommendations and follow-up pending imaging         SUSAN Schwab  [facility]  [address] [place] [address]  Office: [phone]

## 2024-05-17 ENCOUNTER — TELEPHONE (OUTPATIENT)
Dept: GASTROENTEROLOGY | Facility: CLINIC | Age: 44
End: 2024-05-17
Payer: COMMERCIAL

## 2024-06-19 ENCOUNTER — HOSPITAL ENCOUNTER (OUTPATIENT)
Dept: GENERAL RADIOLOGY | Facility: HOSPITAL | Age: 44
Discharge: HOME | End: 2024-06-19
Admitting: NURSE PRACTITIONER
Payer: COMMERCIAL

## 2024-06-19 DIAGNOSIS — R13.10 DYSPHAGIA, UNSPECIFIED TYPE: ICD-10-CM

## 2024-06-19 DIAGNOSIS — K21.9 GASTROESOPHAGEAL REFLUX DISEASE, UNSPECIFIED WHETHER ESOPHAGITIS PRESENT: ICD-10-CM

## 2024-06-19 PROCEDURE — 74221 X-RAY XM ESOPHAGUS 2CNTRST: CPT

## 2024-06-19 RX ADMIN — BARIUM SULFATE 700 MG: 700 TABLET ORAL at 10:10

## 2024-06-19 RX ADMIN — BARIUM SULFATE 183 ML: 960 POWDER, FOR SUSPENSION ORAL at 10:10

## 2024-06-19 RX ADMIN — ANTACID/ANTIFLATULENT 1 PACKET: 380; 550; 10; 10 GRANULE, EFFERVESCENT ORAL at 10:10

## 2024-06-19 RX ADMIN — BARIUM SULFATE 340 ML: 980 POWDER, FOR SUSPENSION ORAL at 10:10

## 2024-06-19 NOTE — PROGRESS NOTES
Please let the patient know that I am covering for Greta.  Please let them know that the esophagus appears normal on the esophagram.  There are no areas of narrowing or stenosis.  There is a small hiatal hernia which can contribute to worsening reflux.  There is moderate evidence of reflux.  Continue further PPI therapy and await any further recommendations from Greta.

## 2024-06-20 ENCOUNTER — TELEPHONE (OUTPATIENT)
Dept: GASTROENTEROLOGY | Facility: CLINIC | Age: 44
End: 2024-06-20
Payer: COMMERCIAL

## 2024-06-20 NOTE — TELEPHONE ENCOUNTER
----- Message from [place] Iglesias sent at 6/19/2024 12:32 PM EDT -----  Please let the patient know that I am covering for Greta.  Please let them know that the esophagus appears normal on the esophagram.  There are no areas of narrowing or stenosis.  There is a small hiatal hernia which can contribute to worsening reflux.  There is moderate evidence of reflux.  Continue further PPI therapy and await any further recommendations from Greta.

## 2024-06-24 NOTE — PROGRESS NOTES
Please inform Hiram esophagram shows a normal esophagus, small hiatal hernia and moderate amount of GERD.  Please check on symptoms.  Thanks BG

## 2025-06-10 ENCOUNTER — TELEPHONE (OUTPATIENT)
Dept: GASTROENTEROLOGY | Facility: CLINIC | Age: 45
End: 2025-06-10
Payer: COMMERCIAL

## 2025-06-10 NOTE — TELEPHONE ENCOUNTER
Patient left  wanting to get refill of pantoprazole. He would like it called in to [facility] in [place], please call the patient back at [phone].

## 2025-06-11 NOTE — TELEPHONE ENCOUNTER
Caller: Hiram Ho    Relationship: Self    Best call back number: [phone]    Requested Prescriptions:   Requested Prescriptions     Pending Prescriptions Disp Refills    pantoprazole (PROTONIX) 40 MG EC tablet 90 tablet 3     Sig: Take 1 tablet by mouth Daily.        Pharmacy where request should be sent: [facility] [id number] [place] [id number] [facility] [address] - [phone] [facility] [phone] FX     Last office visit with prescribing clinician: Visit date not found   Last telemedicine visit with prescribing clinician: Visit date not found   Next office visit with prescribing clinician: 8/25/2025     Additional details provided by patient: PT IS NEEDING REFILL. HAS APPT SCHEDULED WITH VICTOR M ON 08/25/25.     Does the patient have less than a 3 day supply:  [] Yes  [x] No    Would you like a call back once the refill request has been completed: [] Yes [] No    If the office needs to give you a call back, can they leave a voicemail: [] Yes [] No    Barbara Rangel   06/11/25 13:58 EDT

## 2025-06-12 RX ORDER — PANTOPRAZOLE SODIUM 40 MG/1
40 TABLET, DELAYED RELEASE ORAL DAILY
Qty: 90 TABLET | Refills: 0 | Status: SHIPPED | OUTPATIENT
Start: 2025-06-12

## (undated) DEVICE — Device: Brand: DEFENDO AIR/WATER/SUCTION AND BIOPSY VALVE

## (undated) DEVICE — CANN NASL CO2 TRULINK W/O2 A/

## (undated) DEVICE — TUBING, SUCTION, 1/4" X 10', STRAIGHT: Brand: MEDLINE

## (undated) DEVICE — THE TORRENT IRRIGATION SCOPE CONNECTOR IS USED WITH THE TORRENT IRRIGATION TUBING TO PROVIDE IRRIGATION FLUIDS SUCH AS STERILE WATER DURING GASTROINTESTINAL ENDOSCOPIC PROCEDURES WHEN USED IN CONJUNCTION WITH AN IRRIGATION PUMP (OR ELECTROSURGICAL UNIT).: Brand: TORRENT